# Patient Record
Sex: FEMALE | Race: WHITE | NOT HISPANIC OR LATINO | Employment: OTHER
[De-identification: names, ages, dates, MRNs, and addresses within clinical notes are randomized per-mention and may not be internally consistent; named-entity substitution may affect disease eponyms.]

---

## 2023-02-16 ENCOUNTER — HOSPITAL ENCOUNTER (OUTPATIENT)
Facility: CLINIC | Age: 73
Discharge: HOME | End: 2023-02-16
Admitting: FAMILY MEDICINE
Payer: COMMERCIAL

## 2023-02-16 ENCOUNTER — OFFICE VISIT (OUTPATIENT)
Dept: FAMILY MEDICINE | Facility: CLINIC | Age: 73
End: 2023-02-16
Payer: MEDICARE

## 2023-02-16 VITALS
SYSTOLIC BLOOD PRESSURE: 120 MMHG | HEIGHT: 62 IN | RESPIRATION RATE: 16 BRPM | HEART RATE: 74 BPM | BODY MASS INDEX: 22.84 KG/M2 | DIASTOLIC BLOOD PRESSURE: 75 MMHG | OXYGEN SATURATION: 97 % | TEMPERATURE: 97.8 F | WEIGHT: 124.12 LBS

## 2023-02-16 DIAGNOSIS — M81.0 AGE-RELATED OSTEOPOROSIS WITHOUT CURRENT PATHOLOGICAL FRACTURE: ICD-10-CM

## 2023-02-16 DIAGNOSIS — Z00.00 ENCOUNTER FOR MEDICARE ANNUAL WELLNESS EXAM: Primary | ICD-10-CM

## 2023-02-16 DIAGNOSIS — Z85.820 HISTORY OF MELANOMA: ICD-10-CM

## 2023-02-16 DIAGNOSIS — Z13.220 SCREENING FOR LIPOID DISORDERS: ICD-10-CM

## 2023-02-16 LAB
ANION GAP SERPL CALCULATED.3IONS-SCNC: 14 MMOL/L (ref 7–15)
BUN SERPL-MCNC: 12 MG/DL (ref 8–23)
CALCIUM SERPL-MCNC: 10 MG/DL (ref 8.8–10.2)
CHLORIDE SERPL-SCNC: 100 MMOL/L (ref 98–107)
CHOLEST SERPL-MCNC: 195 MG/DL
CREAT SERPL-MCNC: 0.75 MG/DL (ref 0.51–0.95)
DEPRECATED HCO3 PLAS-SCNC: 26 MMOL/L (ref 22–29)
GFR SERPL CREATININE-BSD FRML MDRD: 83 ML/MIN/1.73M2
GLUCOSE SERPL-MCNC: 88 MG/DL (ref 70–99)
HDLC SERPL-MCNC: 67 MG/DL
LDLC SERPL CALC-MCNC: 111 MG/DL
NONHDLC SERPL-MCNC: 128 MG/DL
POTASSIUM SERPL-SCNC: 4.3 MMOL/L (ref 3.4–5.3)
SODIUM SERPL-SCNC: 140 MMOL/L (ref 136–145)
TRIGL SERPL-MCNC: 87 MG/DL

## 2023-02-16 PROCEDURE — 80061 LIPID PANEL: CPT | Performed by: FAMILY MEDICINE

## 2023-02-16 PROCEDURE — 82306 VITAMIN D 25 HYDROXY: CPT | Performed by: FAMILY MEDICINE

## 2023-02-16 PROCEDURE — 80048 BASIC METABOLIC PNL TOTAL CA: CPT | Performed by: FAMILY MEDICINE

## 2023-02-16 ASSESSMENT — ENCOUNTER SYMPTOMS
CHILLS: 0
HEADACHES: 0
ARTHRALGIAS: 1
BREAST MASS: 0
DYSURIA: 0
SORE THROAT: 0
PALPITATIONS: 0
DIZZINESS: 0
HEMATURIA: 0
JOINT SWELLING: 0
DIARRHEA: 0
ABDOMINAL PAIN: 0
SHORTNESS OF BREATH: 0
MYALGIAS: 1
FREQUENCY: 0
COUGH: 0
HEMATOCHEZIA: 0
HEARTBURN: 0
NAUSEA: 0
NERVOUS/ANXIOUS: 0
PARESTHESIAS: 0
CONSTIPATION: 0
FEVER: 0
EYE PAIN: 0
WEAKNESS: 0

## 2023-02-16 ASSESSMENT — ACTIVITIES OF DAILY LIVING (ADL): CURRENT_FUNCTION: NO ASSISTANCE NEEDED

## 2023-02-16 NOTE — NURSING NOTE
"72 year old  Chief Complaint   Patient presents with     Wellness Visit     Routine medicare.       Blood pressure 133/81, pulse 74, temperature 97.8  F (36.6  C), temperature source Skin, resp. rate 16, height 1.562 m (5' 1.5\"), weight 56.3 kg (124 lb 1.9 oz), SpO2 97 %. Body mass index is 23.07 kg/m .  There is no problem list on file for this patient.      Wt Readings from Last 2 Encounters:   02/16/23 56.3 kg (124 lb 1.9 oz)     BP Readings from Last 3 Encounters:   02/16/23 133/81         No current outpatient medications on file.     No current facility-administered medications for this visit.       Social History     Tobacco Use     Smoking status: Never     Smokeless tobacco: Never   Vaping Use     Vaping Use: Never used   Substance Use Topics     Alcohol use: Not Currently     Drug use: Never       There are no preventive care reminders to display for this patient.    No results found for: PAP      February 16, 2023 2:48 PM  ]  "

## 2023-02-16 NOTE — PROGRESS NOTES
"CC: Wellness Visit (Routine medicare.)      SUBJECTIVE:  Capri is a 72 year old female with osteoporosis here for a physical exam. She recently moved from [facility] to be closer to her grandchildren, ages 2 and 6.     Her current problems were reviewed. The above nursing notes were also reviewed. The past medical history, allergies, family medical history, surgical history and social history were reviewed with her and updated as necessary.        Prevention History:    Activities of Daily Living: no assistance needed  Home safety: lack of grab bars in the bathroom  Hearing Impairment:: no hearing concerns    - Advance directive - she has this at home.  - Fall Risk - none.   - Independent at home - yes.     COGNITIVE SCREEN  1) Repeat 3 items (Banana, Sunrise, Chair)    2) Clock draw: NORMAL  3) 3 item recall: Recalls 2 objects   Results: 3 items recalled: COGNITIVE IMPAIRMENT LESS LIKELY    - Vaccines - did not get pneumonia or shingles vaccines; declines. Up to date on Covid vaccines.   - Cervical cancer screening - No history of abnormal Pap smears.   - Breast Screening - Last mammogram was last year and normal.   - Colorectal cancer screening - Last colon cancer screening was in 2022.  - Osteoporosis screening - 1/2023 - osteoporosis, taking calcium and vitamin D, no prescription medications yet, sister felt very sick on medications.   - Hypertension - Patient is normotensive.  - DM screening - has had normal screening.   - Hyperlipidemia screening - did have elevated cholesterol in the past per her report. Did a CT calcium score and her score was 0.    - Nutrition/Exercise - Body mass index is 23.07 kg/m . Yoga, walking, considering starting Pilates;  Healthy nutrition.  - Alcohol misuse - no concerns.  - Tobacco use - none.        OBJECTIVE:  /75 (BP Location: Right arm, Patient Position: Sitting, Cuff Size: Adult Regular)   Pulse 74   Temp 97.8  F (36.6  C) (Skin)   Resp 16   Ht 1.562 m (5' 1.5\")   Wt " 56.3 kg (124 lb 1.9 oz)   LMP  (LMP Unknown)   SpO2 97%   BMI 23.07 kg/m     GENERAL:  Healthy, alert; in no distress  SKIN: Normal skin color, texture, and turgor  HEENT:  Normocephalic without masses, lesions, tenderness, or abnormalities;  TMs normal, Conjunctiva and sclera clear, PERRL.Oropharyngeal mucosa is moist without lesions or exudates; teeth are in good repair  NECK: Supple and free of adenopathy or masses, no thyroid enlargement or nodules.  CHEST: Clear to auscultation. Normal work of breathing.   CARDIAC: Regular rate and rhythm, normal S1 and S2,  no murmurs, rubs, clicks, or gallops  ABDOMEN: Soft, nontender; no masses or hepatosplenomegaly  EXTREMITIES:  No deformities, edema, skin discoloration, or lesions  NEUROLOGICAL:  CN II - XII intact  PSYCH: Mood and affect appropriate.     ASSESSMENT/PLAN:   Capri was seen today for wellness visit.    Diagnoses and all orders for this visit:    Encounter for Medicare annual wellness exam    Screening for lipoid disorders  -     Basic metabolic panel; Future  -     Lipid Profile; Future  -     Basic metabolic panel  -     Lipid Profile    History of melanoma  -     Adult Dermatology Referral; Future    Age-related osteoporosis without current pathological fracture  -     Vitamin D deficiency screening; Future  -     Vitamin D deficiency screening    Will obtain records from [facility] in [facility]. Discussed osteoporosis dx -- recommend pharmacologic therapy. She would like for me to review the bone density scans and then would consider prescription treatment.   Check labs today.   Referral to dermatology for history of melanoma; requires quarterly skin checks.     Return in about 53 weeks (around 2/22/2024) for Annual Wellness Visit.    Olimpia Lambert MD  Family Medicine

## 2023-02-16 NOTE — PATIENT INSTRUCTIONS
Patient Education   Personalized Prevention Plan  You are due for the preventive services outlined below.  Your care team is available to assist you in scheduling these services.  If you have already completed any of these items, please share that information with your care team to update in your medical record.  Health Maintenance Due   Topic Date Due     Osteoporosis Screening  Never done     Discuss Advance Care Planning  Never done     Mammogram  Never done     COVID-19 Vaccine (1) Never done     Colorectal Cancer Screening  Never done     Hepatitis C Screening  Never done     Diptheria Tetanus Pertussis (DTAP/TDAP/TD) Vaccine (1 - Tdap) Never done     Cholesterol Lab  Never done     Zoster (Shingles) Vaccine (1 of 2) Never done     Annual Wellness Visit  Never done     Pneumococcal Vaccine (1 - PCV) Never done     Flu Vaccine (1) Never done

## 2023-02-17 LAB — DEPRECATED CALCIDIOL+CALCIFEROL SERPL-MC: 48 UG/L (ref 20–75)

## 2023-02-20 ENCOUNTER — TELEPHONE (OUTPATIENT)
Dept: FAMILY MEDICINE | Facility: CLINIC | Age: 73
End: 2023-02-20

## 2023-02-20 NOTE — CONFIDENTIAL NOTE
Called pt to let her know that all of her labs (BMP, lipids, Vit D) came back, they look great and Dr. Lambert has no concerns.  Pt was happy to hear it.    VIRI Awan, RN  02/20/23, 11:46 AM

## 2023-03-15 ENCOUNTER — TELEPHONE (OUTPATIENT)
Dept: DERMATOLOGY | Facility: CLINIC | Age: 73
End: 2023-03-15

## 2023-03-15 ENCOUNTER — OFFICE VISIT (OUTPATIENT)
Dept: DERMATOLOGY | Facility: CLINIC | Age: 73
End: 2023-03-15
Payer: MEDICARE

## 2023-03-15 DIAGNOSIS — Z85.820 HISTORY OF MELANOMA: ICD-10-CM

## 2023-03-15 DIAGNOSIS — L71.9 ROSACEA: Primary | ICD-10-CM

## 2023-03-15 DIAGNOSIS — D23.9 BLUE NEVUS: ICD-10-CM

## 2023-03-15 PROCEDURE — 99204 OFFICE O/P NEW MOD 45 MIN: CPT | Performed by: DERMATOLOGY

## 2023-03-15 ASSESSMENT — PAIN SCALES - GENERAL: PAINLEVEL: NO PAIN (0)

## 2023-03-15 NOTE — PROGRESS NOTES
[facility] Dermatology Note  Encounter Date: Mar 15, 2023  Office Visit     Dermatology Problem List:  1. Hx melanoma, nasal bridge, [facility]  - MILKA signed  2. Rosacea  ____________________________________________    Assessment & Plan:    # Hx melanoma (unknown path), nasal bridge, s/p MMS 2014. No evidence of clinical recurrence on exam today.  - Records [place]irstin Escobar ([place])  - Follow-up for skin exam in September  - ABCDs of melanoma were discussed and self skin checks were advised.  - Sun precaution was advised including the use of sun screens of SPF 30 or higher, sun protective clothing, and avoidance of tanning beds.    # ET type rosacea, nose. Chronic, flare.   Discussed option for treatment including prescription topical therapies and PDL. Discussed that topical therapies may have more limited benefit for ET-type rosacea. Patient agreed to consult with Dr. Gomes to discuss PDL.   - Referral to cosmetic dermatology for PDL    # Blue nevus, left eyebrow  - Reassured of benign nature, no treatment necessary.   - ABCDs of melanoma were discussed and self skin checks were advised.  - Sun precaution was advised including the use of sun screens of SPF 30 or higher, sun protective clothing, and avoidance of tanning beds.      Procedures Performed:   None    Follow-up: 6 month(s) in-person, or earlier for new or changing lesions    Staff and Scribe:     Scribe Disclosure:  I, Elan Sunshine, am serving as a scribe to document services personally performed by Chase Sherman MD based on data collection and the provider's statements to me.     Provider Disclosure:   The documentation recorded by the scribe accurately reflects the services I personally performed and the decisions made by me.    Chase Sherman MD    Department of Dermatology  [facility]: Phone: [phone],  Fax:[phone]  [facility]: Phone: [phone] Fax: [phone]  ____________________________________________    CC: Derm Problem (Pt would like to establish care with a new dermatologist.)    HPI:  Ms. Capri Simpson is a(n) 73 year old female who presents today as a new patient to establish care. Patient was referred to dermatology for history of melanoma by PCP.    Today, patient reports that she has a history of melanoma on the nose in approximately 2014 by Dr. Claudia Escobar. She just moved from [place] where she recently had a FBSE. Denies family history of melanoma. She reports a history of many blistering sunburns. No history of indoor tanning or outdoor work. She declines FBSE today. She also reports that she gets rosacea on the nose.    Patient is otherwise feeling well, without additional skin concerns.    Labs Reviewed:  N/A    Physical Exam:  Vitals: LMP  (LMP Unknown)   SKIN: Focused examination of face was performed.  - Well healed scar on the nose.  - Telangiectasias on bilateral nose.  - Blue papule on the left eyebrow.  - No other lesions of concern on areas examined.     Medications:  Current Outpatient Medications   Medication     Calcium-Vitamin D 500-3.125 MG-MCG TABS     No current facility-administered medications for this visit.      Past Medical History:   Patient Active Problem List   Diagnosis     History of melanoma     Age-related osteoporosis without current pathological fracture     Past Medical History:   Diagnosis Date     Melanoma of skin (H)     On tip of nose, removed by dermatologist. Dr Escobar     Osteoporosis     2019 and 2023        CC Olimpia Lambert MD  [address] [place] [address] on close of this encounter.

## 2023-03-15 NOTE — LETTER
3/15/2023       RE: Capri Simpson  [address]  [facility] [address]     Dear Colleague,    Thank you for referring your patient, Capri Simpson, to the [facility] [place] at [facility]. Please see a copy of my visit note below.    [facility] Dermatology Note  Encounter Date: Mar 15, 2023  Office Visit     Dermatology Problem List:  1. Hx melanoma, nasal bridge, s/p MMS (?2014) in [facility]  - MILKA signed  2. Rosacea  ____________________________________________    Assessment & Plan:    # Hx melanoma (unknown path), nasal bridge, s/p MMS 2014. No evidence of clinical recurrence on exam today.  - Records [place] Kirstin [place]obar ([place]  - Follow-up for skin exam in September  - ABCDs of melanoma were discussed and self skin checks were advised.  - Sun precaution was advised including the use of sun screens of SPF 30 or higher, sun protective clothing, and avoidance of tanning beds.    # ET type rosacea, nose. Chronic, flare.   Discussed option for treatment including prescription topical therapies and PDL. Discussed that topical therapies may have more limited benefit for ET-type rosacea. Patient agreed to consult with Dr. Gomes to discuss PDL.   - Referral to cosmetic dermatology for PDL    # Blue nevus, left eyebrow  - Reassured of benign nature, no treatment necessary.   - ABCDs of melanoma were discussed and self skin checks were advised.  - Sun precaution was advised including the use of sun screens of SPF 30 or higher, sun protective clothing, and avoidance of tanning beds.      Procedures Performed:   None    Follow-up: 6 month(s) in-person, or earlier for new or changing lesions    Staff and Scribe:     Scribe Disclosure:  Elan PIPER, am serving as a scribe to document services personally performed by Chase Sherman MD based on data collection and the provider's  statements to me.     Provider Disclosure:   The documentation recorded by the scribe accurately reflects the services I personally performed and the decisions made by me.    Chase Sherman MD    Department of Dermatology  [facility]: Phone: [phone], Fax:[phone]  [facility]: Phone: [phone] Fax: [phone]  ____________________________________________    CC: Derm Problem (Pt would like to establish care with a new dermatologist.)    HPI:  Ms. Capri Simpson is a(n) 73 year old female who presents today as a new patient to establish care. Patient was referred to dermatology for history of melanoma by PCP.    Today, patient reports that she has a history of melanoma on the nose in approximately 2014 by Dr. Claudia Escobar. She just moved from [place] where she recently had a FBSE. Denies family history of melanoma. She reports a history of many blistering sunburns. No history of indoor tanning or outdoor work. She declines FBSE today. She also reports that she gets rosacea on the nose.    Patient is otherwise feeling well, without additional skin concerns.    Labs Reviewed:  N/A    Physical Exam:  Vitals: LMP  (LMP Unknown)   SKIN: Focused examination of face was performed.  - Well healed scar on the nose.  - Telangiectasias on bilateral nose.  - Blue papule on the left eyebrow.  - No other lesions of concern on areas examined.     Medications:  Current Outpatient Medications   Medication     Calcium-Vitamin D 500-3.125 MG-MCG TABS     No current facility-administered medications for this visit.      Past Medical History:   Patient Active Problem List   Diagnosis     History of melanoma     Age-related osteoporosis without current pathological fracture     Past Medical History:   Diagnosis Date     Melanoma of skin (H)     On tip of nose, removed by dermatologist.   Shawn     Osteoporosis     2019 and 2023        CC Olimpia Lambert MD  [address] [place] [address] on close of this encounter.

## 2023-03-15 NOTE — TELEPHONE ENCOUNTER
Writer faxed release of information to dermatologist in [place] - Kirstin Escobar MD.    [address] [place] [address] [address]  [place] [address]  Hours: Monday - Friday: 7:30AM - 8PM (ET)   T. [phone]  F. [phone]      Nasir Gonsales, EMT

## 2023-03-15 NOTE — NURSING NOTE
Chief Complaint   Patient presents with     Derm Problem     Pt would like to establish care with a new dermatologist.     Nasir Gonsales, EMT

## 2023-05-21 ENCOUNTER — HEALTH MAINTENANCE LETTER (OUTPATIENT)
Age: 73
End: 2023-05-21

## 2023-05-23 ENCOUNTER — TELEPHONE (OUTPATIENT)
Dept: DERMATOLOGY | Facility: CLINIC | Age: 73
End: 2023-05-23
Payer: MEDICARE

## 2023-05-23 NOTE — TELEPHONE ENCOUNTER
Lvm, 2nd attempt informing patient her appt. With Dr. Sherman 9/20 has been cancelled. Dr. Sherman will not be in clinic. Please call [phone] to r/s.

## 2023-06-08 ENCOUNTER — MYC MEDICAL ADVICE (OUTPATIENT)
Dept: FAMILY MEDICINE | Facility: CLINIC | Age: 73
End: 2023-06-08

## 2023-10-10 RX ORDER — AZELAIC ACID 0.15 G/G
GEL TOPICAL
COMMUNITY
Start: 2023-01-04 | End: 2024-04-10

## 2023-10-10 RX ORDER — PIMECROLIMUS 10 MG/G
CREAM TOPICAL
COMMUNITY
Start: 2023-01-04

## 2023-10-11 ENCOUNTER — OFFICE VISIT (OUTPATIENT)
Dept: FAMILY MEDICINE | Facility: CLINIC | Age: 73
End: 2023-10-11
Payer: MEDICARE

## 2023-10-11 VITALS
TEMPERATURE: 97.3 F | HEIGHT: 61 IN | BODY MASS INDEX: 23.8 KG/M2 | SYSTOLIC BLOOD PRESSURE: 115 MMHG | HEART RATE: 69 BPM | OXYGEN SATURATION: 98 % | DIASTOLIC BLOOD PRESSURE: 77 MMHG | WEIGHT: 126.04 LBS

## 2023-10-11 DIAGNOSIS — M81.0 AGE-RELATED OSTEOPOROSIS WITHOUT CURRENT PATHOLOGICAL FRACTURE: Primary | ICD-10-CM

## 2023-10-11 DIAGNOSIS — M54.2 NECK PAIN: ICD-10-CM

## 2023-10-11 LAB
ALBUMIN SERPL BCG-MCNC: 4.4 G/DL (ref 3.5–5.2)
ALP SERPL-CCNC: 77 U/L (ref 35–104)
ALT SERPL W P-5'-P-CCNC: 8 U/L (ref 0–50)
ANION GAP SERPL CALCULATED.3IONS-SCNC: 9 MMOL/L (ref 7–15)
AST SERPL W P-5'-P-CCNC: 20 U/L (ref 0–45)
BILIRUB SERPL-MCNC: 0.5 MG/DL
BUN SERPL-MCNC: 12.6 MG/DL (ref 8–23)
CALCIUM SERPL-MCNC: 9.7 MG/DL (ref 8.8–10.2)
CHLORIDE SERPL-SCNC: 102 MMOL/L (ref 98–107)
CREAT SERPL-MCNC: 0.77 MG/DL (ref 0.51–0.95)
DEPRECATED HCO3 PLAS-SCNC: 29 MMOL/L (ref 22–29)
EGFRCR SERPLBLD CKD-EPI 2021: 80 ML/MIN/1.73M2
ERYTHROCYTE [DISTWIDTH] IN BLOOD BY AUTOMATED COUNT: 13.5 % (ref 10–15)
GLUCOSE SERPL-MCNC: 82 MG/DL (ref 70–99)
HCT VFR BLD AUTO: 41.5 % (ref 35–47)
HGB BLD-MCNC: 13.2 G/DL (ref 11.7–15.7)
MCH RBC QN AUTO: 27.3 PG (ref 26.5–33)
MCHC RBC AUTO-ENTMCNC: 31.8 G/DL (ref 31.5–36.5)
MCV RBC AUTO: 86 FL (ref 78–100)
PHOSPHATE SERPL-MCNC: 3.6 MG/DL (ref 2.5–4.5)
PLATELET # BLD AUTO: 258 10E3/UL (ref 150–450)
POTASSIUM SERPL-SCNC: 4.7 MMOL/L (ref 3.4–5.3)
PROT SERPL-MCNC: 7 G/DL (ref 6.4–8.3)
RBC # BLD AUTO: 4.84 10E6/UL (ref 3.8–5.2)
SODIUM SERPL-SCNC: 140 MMOL/L (ref 135–145)
TSH SERPL DL<=0.005 MIU/L-ACNC: 1.53 UIU/ML (ref 0.3–4.2)
WBC # BLD AUTO: 6.8 10E3/UL (ref 4–11)

## 2023-10-11 PROCEDURE — 85027 COMPLETE CBC AUTOMATED: CPT | Mod: ORL | Performed by: FAMILY MEDICINE

## 2023-10-11 PROCEDURE — 80053 COMPREHEN METABOLIC PANEL: CPT | Mod: ORL | Performed by: FAMILY MEDICINE

## 2023-10-11 PROCEDURE — 84443 ASSAY THYROID STIM HORMONE: CPT | Mod: ORL | Performed by: FAMILY MEDICINE

## 2023-10-11 PROCEDURE — 83970 ASSAY OF PARATHORMONE: CPT | Mod: ORL | Performed by: FAMILY MEDICINE

## 2023-10-11 PROCEDURE — 84100 ASSAY OF PHOSPHORUS: CPT | Mod: ORL | Performed by: FAMILY MEDICINE

## 2023-10-11 NOTE — PROGRESS NOTES
"CC: Osteoporosis (Discuss options for treatment )      Subjective:   Capri Simpson is a 73 year old who presents for follow-up of DEXA scan from [facility].         Osteoporosis. Diagnosed by DEXA in 2019.  - Most recent DEXA in 1/5/2023 showed severe osteoporosis. Plan repeat DEXA in 2 years.   - She has not had a fracture.   - Medications: Currently taking 500 mg of calcium daily and 2000 international units of vitamin D daily.   - Rx treatment started? No. She had initially declined medication, but is now interested in starting.   - She has never smoked. No alcohol use.   - Mom and sister also with osteoporosis.   - Does yoga regularly and walks regularly.   - Most recent surveillance labs reviewed today and are significant for normal vitamin D level and normal BMP.    Fracture hx: none as an adult   Chronic glucocorticoid use: none   Previous osteoporosis treatment(s): none   Hx of hypercalcemia: none   Hx of nephrolithiasis: none      Contraindications to osteoporosis treatment options:  Bisphosphonates:              -No history of GERD               -No history of kidney disease  PTH Analogs:              -Denies known history of hypercalcemia              -Denies known history of nephrolithiasis              -Denies known family history of osteosarcoma              -Denies history of external beam radiation therapy  Evenity:              -Denies MI or CVA within the last 12 months     Upcoming invasive dental work: Going to have an implant. Has an appt for the dentist later today.               Also at the end of the visit (after labs drawn), she asked to speak to me again to place a referral to PT for neck pain. She thinks related to how she is sleeping.           Objective:   /77   Pulse 69   Temp 97.3  F (36.3  C)   Ht 1.562 m (5' 1.5\")   Wt 57.2 kg (126 lb 0.6 oz)   LMP  (LMP Unknown)   SpO2 98%   BMI 23.43 kg/m     General: Alert and oriented in no acute distress.  Psych: Mood and " affect appropriate.      Component      Latest Ref Rng 2/16/2023  3:28 PM   Sodium      136 - 145 mmol/L 140    Potassium      3.4 - 5.3 mmol/L 4.3    Chloride      98 - 107 mmol/L 100    Carbon Dioxide (CO2)      22 - 29 mmol/L 26    Anion Gap      7 - 15 mmol/L 14    Urea Nitrogen      8.0 - 23.0 mg/dL 12.0    Creatinine      0.51 - 0.95 mg/dL 0.75    Calcium      8.8 - 10.2 mg/dL 10.0    Glucose      70 - 99 mg/dL 88    GFR Estimate      >60 mL/min/1.73m2 83    Vitamin D Deficiency screening      20 - 75 ug/L 48          Plan:    Severe osteoporosis    Discussed treatment options  R/o secondary causes with labs today     Osteoporosis medications called bisphosphonates (Fosamax, Actonel, Boniva) should be taken on an empty stomach and you should sit up for 30 minutes after taking the medication. Oral bisphosphonates should not be used as initial therapy in patients with esophageal disorders, an inability to follow the dosing requirements (eg, stay upright for at least 30 to 60 minutes), or chronic kidney disease (CKD; estimated glomerular filtration [eGFR] rate <30 mL/min). She is a bit wary about bisphosphonates, peg with her upcoming dental work.     Also discussed Forteo, Prolia, or Evenity. She will schedule a visit with Dr. Oropeza to discuss options in detail and consider insurance issues.     Repeat DEXA in 2 years.     Neck pain - At the end of the visit (after labs drawn), she asked to speak to me again to place a referral to PT for neck pain. She thinks related to how she is sleeping. PT referral placed.       Olimpia Lambert MD

## 2023-10-11 NOTE — NURSING NOTE
"73 year old  Chief Complaint   Patient presents with    Osteoporosis     Discuss options for treatment        Blood pressure 115/77, pulse 69, temperature 97.3  F (36.3  C), height 1.562 m (5' 1.5\"), weight 57.2 kg (126 lb 0.6 oz), SpO2 98%. Body mass index is 23.43 kg/m .  Patient Active Problem List   Diagnosis    History of melanoma    Age-related osteoporosis without current pathological fracture       Wt Readings from Last 2 Encounters:   10/11/23 57.2 kg (126 lb 0.6 oz)   02/16/23 56.3 kg (124 lb 1.9 oz)     BP Readings from Last 3 Encounters:   10/11/23 115/77   02/16/23 120/75         Current Outpatient Medications   Medication    azelaic acid (FINACIA) 15 % external gel    Calcium-Vitamin D 500-3.125 MG-MCG TABS    pimecrolimus (ELIDEL) 1 % external cream     No current facility-administered medications for this visit.       Social History     Tobacco Use    Smoking status: Never    Smokeless tobacco: Never   Vaping Use    Vaping Use: Never used   Substance Use Topics    Alcohol use: Not Currently    Drug use: Never       Health Maintenance Due   Topic Date Due    DEXA  Never done    MAMMO SCREENING  Never done    COVID-19 Vaccine (1) Never done    COLORECTAL CANCER SCREENING  Never done    HEPATITIS C SCREENING  Never done    DTAP/TDAP/TD IMMUNIZATION (1 - Tdap) Never done    ZOSTER IMMUNIZATION (1 of 2) Never done    RSV VACCINE 60+ (1 - 1-dose 60+ series) Never done    Pneumococcal Vaccine: 65+ Years (1 - PCV) Never done    INFLUENZA VACCINE (1) Never done       No results found for: \"PAP\"      October 11, 2023 11:37 AM    "

## 2023-10-11 NOTE — Clinical Note
Would you be able to follow-up with Capri regarding osteoporosis meds? She has severe osteoporosis of the lumbar spine, has never been treated before, and is interested in non-bisphosphonate option. We discussed maybe Prolia

## 2023-10-12 LAB — PTH-INTACT SERPL-MCNC: 43 PG/ML (ref 15–65)

## 2023-10-18 ENCOUNTER — THERAPY VISIT (OUTPATIENT)
Dept: PHYSICAL THERAPY | Facility: CLINIC | Age: 73
End: 2023-10-18
Attending: FAMILY MEDICINE
Payer: MEDICARE

## 2023-10-18 DIAGNOSIS — M54.2 NECK PAIN: ICD-10-CM

## 2023-10-18 PROCEDURE — 97161 PT EVAL LOW COMPLEX 20 MIN: CPT | Mod: GP | Performed by: PHYSICAL THERAPIST

## 2023-10-18 PROCEDURE — 97110 THERAPEUTIC EXERCISES: CPT | Mod: GP | Performed by: PHYSICAL THERAPIST

## 2023-10-18 NOTE — PROGRESS NOTES
PHYSICAL THERAPY EVALUATION  Type of Visit: Evaluation    See electronic medical record for Abuse and Falls Screening details.    Subjective       Presenting condition or subjective complaint: neck pain  MD order 10/11/23  Pt reports neck pain.  Did have pain in the past which she did PT and acupuncture for which really helped.  Then pain came back in July 2023. Neck pain and into left shoulder. Tried the chiropractor with a pulsing treatment which helped.  Does get a buzzing sound in the ear which has caused her to try a new pillow lately.   Can get pain when she wakes up in the AM.   Is looking for a good set of exercises to do in the AM or during the day to help with pain.    Date of onset: 10/11/23 (MD order. Onset July 2023)    Relevant medical history: Osteoporosis   Dates & types of surgery: none    Prior diagnostic imaging/testing results:       Prior therapy history for the same diagnosis, illness or injury: Yes PT and acupuncture      Living Environment  Social support: With a significant other or spouse   Type of home: Apartment/condo   Stairs to enter the home: Yes 20 Is there a railing: Yes   Ramp: No   Stairs inside the home: No       Help at home: None  Equipment owned:       Employment: Yes psychotherapist  Hobbies/Interests: knitting, walking    Patient goals for therapy: reduce discomfort       Objective   CERVICAL SPINE EVALUATION  PAIN: Pain Level at Rest: 0/10  Pain Level at worst: 3/10  Pain Location/present symptoms: left neck and into left shoulder  Paresthesia (yes/no):  none  Pain Quality: tight, discomfort  Pain Frequency: intermittent  Pain is Worst: in the morning   Pain is Exacerbated By: walking up in the morning, sleeping on left side  Pain is Relieved By: cream (voltaran, biofreeze)  Pain Progression: Unchanged    Movement Loss:   Ned Mod Min Nil Symptoms   Flexion    x NE   Retraction  x   L neck tightness   Extension  x   NE   Lateral flexion R x    Stiffness, mild pain   Lateral  flexion L  x   Stiffness, mild pain   Rotation R  x   Tightness on R   Rotation L  x   Incr pain     Thoracic ROM:    Left AROM Right AROM    Thoracic Flexion     Thoracic Extension Mod limit - stiffness    Thoracic Rotation Mod limit Min limit        SHOULDER STRENGTH:   Pain: - none + mild ++ moderate +++ severe  Strength Scale: 0-5/5 Left Right   Shoulder Flexion 5- 5-   Shoulder Abduction 5- 5-   Shoulder External Rotation 4+ 5-   Shoulder Horizontal Abduction 4 4     Posture: mild forward head posture  PALPATION:  TTP and incr tone of UT and levator      Assessment & Plan   CLINICAL IMPRESSIONS  Medical Diagnosis: Neck pain    Treatment Diagnosis: neck pain   Impression/Assessment: Patient is a 73 year old female with left sided neck complaints.  The following significant findings have been identified: Pain, Decreased ROM/flexibility, Decreased strength, Decreased activity tolerance, and Impaired posture. These impairments interfere with their ability to perform  sleeping  as compared to previous level of function.     Clinical Decision Making (Complexity):  Clinical Presentation: Stable/Uncomplicated  Clinical Presentation Rationale: based on medical and personal factors listed in PT evaluation  Clinical Decision Making (Complexity): Low complexity    PLAN OF CARE  Treatment Interventions:  Interventions: Manual Therapy, Neuromuscular Re-education, Therapeutic Activity, Therapeutic Exercise    Long Term Goals     PT Goal 1  Goal Identifier: neck  Goal Description: pt will be able to wake up in the AM painfree in the neck  Rationale:  (to establish restorative sleep pattern)  Goal Progress: pain 3/10  Target Date: 12/17/23      Frequency of Treatment: 2x/month  Duration of Treatment: 2 months      Education Assessment:        Risks and benefits of evaluation/treatment have been explained.   Patient/Family/caregiver agrees with Plan of Care.     Evaluation Time:     PT Eval, Low Complexity Minutes (15841):  20       Signing Clinician: Chelly Field PT      [facility]                                                                                   OUTPATIENT PHYSICAL THERAPY      PLAN OF TREATMENT FOR OUTPATIENT REHABILITATION   Patient's Last Name, First Name, ALIZAMyeshaCapri Griffin YOB: 1950   Provider's Name   [facility]   Medical Record No.  [id number]     Onset Date: 10/11/23 (MD order. Onset July 2023)  Start of Care Date: 10/18/23     Medical Diagnosis:  Neck pain      PT Treatment Diagnosis:  neck pain Plan of Treatment  Frequency/Duration: 2x/month/ 2 months    Certification date from 10/18/23 to 12/17/23         See note for plan of treatment details and functional goals     Chelly Field, PT                         I CERTIFY THE NEED FOR THESE SERVICES FURNISHED UNDER        THIS PLAN OF TREATMENT AND WHILE UNDER MY CARE     (Physician attestation of this document indicates review and certification of the therapy plan).                Referring Provider:  Olimpia Lambert      Initial Assessment  See Epic Evaluation- Start of Care Date: 10/18/23

## 2023-10-19 ENCOUNTER — OFFICE VISIT (OUTPATIENT)
Dept: DERMATOLOGY | Facility: CLINIC | Age: 73
End: 2023-10-19
Payer: MEDICARE

## 2023-10-19 DIAGNOSIS — L81.4 SOLAR LENTIGO: ICD-10-CM

## 2023-10-19 DIAGNOSIS — D23.9 BLUE NEVUS: ICD-10-CM

## 2023-10-19 DIAGNOSIS — L71.9 ROSACEA: Primary | ICD-10-CM

## 2023-10-19 DIAGNOSIS — L82.1 SEBORRHEIC KERATOSES: ICD-10-CM

## 2023-10-19 DIAGNOSIS — D22.9 MULTIPLE BENIGN NEVI: ICD-10-CM

## 2023-10-19 DIAGNOSIS — L57.8 PHOTOAGING OF SKIN: ICD-10-CM

## 2023-10-19 DIAGNOSIS — Z85.820 HISTORY OF MELANOMA: ICD-10-CM

## 2023-10-19 PROCEDURE — 99213 OFFICE O/P EST LOW 20 MIN: CPT | Mod: GC | Performed by: STUDENT IN AN ORGANIZED HEALTH CARE EDUCATION/TRAINING PROGRAM

## 2023-10-19 ASSESSMENT — PAIN SCALES - GENERAL: PAINLEVEL: NO PAIN (0)

## 2023-10-19 NOTE — PATIENT INSTRUCTIONS

## 2023-10-19 NOTE — PROGRESS NOTES
[facility] Dermatology Note  Encounter Date: Oct 19, 2023  Office Visit     Dermatology Problem List:  1. Hx melanoma, nasal bridge, s/p MMS (?2014) in [facility]  - MILKA signed, advised patient to contact clinic and to bring records  2. Rosacea  - Planning for PDL  ____________________________________________    Assessment & Plan:  # Hx melanoma (unknown path), nasal bridge, s/p MMS 2014. No evidence of clinical recurrence on exam today.  - Records requested [place] Kirstin Escobar ([place] previously, advised patient to contact clinic herself and to bring records  - Follow-up for skin exam yearly  - ABCDs of melanoma were discussed and self skin checks were advised.  - Sun precaution was advised including the use of sun screens of SPF 30 or higher, sun protective clothing, and avoidance of tanning beds.    # ET type rosacea, nose.   - Has PDL scheduled with Dr. Gomes    # Blue nevus, left eyebrow  - Reassured of benign nature, no treatment necessary.   - ABCDs of melanoma were discussed and self skin checks were advised.  - Sun precaution was advised including the use of sun screens of SPF 30 or higher, sun protective clothing, and avoidance of tanning beds.    # Benign skin findings  - Physical exam demonstrating benign skin findings as below.  - Seborrheic keratoses  - Solar lentingos   - Benign nevi  - Reassurance provided.  - ABCDEs of melanoma handout provided.  - Advised patient to return to clinic for any new or concerning lesions.      Procedures Performed:   None    Follow-up: 12 month(s) in-person, or earlier for new or changing lesions    Staff and Resident: MARSHALL Guerrero MD  PGY-4 Dermatology  Pager: [phone]     ____________________________________________    CC: Derm Problem (FBSE- no spots of concern. She reports having rosacea. )    HPI:  Ms. Capri Simpson is a(n) 73 year old female who presents today as a return patient to follow up skin check    - no lesions of concern  -  left eyebrow nevus stable  - had to cancel PDL, rescheduled for January  - no f/c/wl/ns    Prior hx:  Today, patient reports that she has a history of melanoma on the nose in approximately 2014 by Dr. Claudia Escobar. She just moved from [place] where she recently had a FBSE. Denies family history of melanoma. She reports a history of many blistering sunburns. No history of indoor tanning or outdoor work. She declines FBSE today. She also reports that she gets rosacea on the nose.    Patient is otherwise feeling well, without additional skin concerns.    Labs Reviewed:  N/A    Physical Exam:  Vitals: LMP  (LMP Unknown)   SKIN: TBSE performed excluding breast, buttock and groin (patient deferred).   - Well healed scar on the nose.  - Telangiectasias on bilateral nose.  - Blue papule on the left eyebrow.  - waxy stuck on papules on trunk and extremities  - banal appearing brown papules with reticular networks on dermoscopy on trunk and extremities  - no cervical, axillary, supraclavicular LAD   - light brown macules on sun exposed sites  - No other lesions of concern on areas examined.     Medications:  Current Outpatient Medications   Medication    azelaic acid (FINACIA) 15 % external gel    Calcium-Vitamin D 500-3.125 MG-MCG TABS    pimecrolimus (ELIDEL) 1 % external cream     No current facility-administered medications for this visit.      Past Medical History:   Patient Active Problem List   Diagnosis    History of melanoma    Age-related osteoporosis without current pathological fracture    Neck pain     Past Medical History:   Diagnosis Date    Melanoma of skin (H)     On tip of nose, removed by dermatologist. Dr Escobar    Osteoporosis     2019 and 2023        CC Olimpia Lambert MD  [address] [place] [address] on close of this encounter.

## 2023-10-19 NOTE — NURSING NOTE
Dermatology Rooming Note    Capri Simpson's goals for this visit include:   Chief Complaint   Patient presents with    Derm Problem     FBSE- no spots of concern. She reports having rosacea.      Charito Chapman, EMT

## 2023-10-19 NOTE — LETTER
10/19/2023       RE: Capri Simpson  [address]  [facility] [address]     Dear Colleague,    Thank you for referring your patient, Capri Simpson, to the [facility] [place] at [facility]. Please see a copy of my visit note below.    [facility] Dermatology Note  Encounter Date: Oct 19, 2023  Office Visit     Dermatology Problem List:  1. Hx melanoma, nasal bridge, s/p MMS (?2014) in [facility]  - MILKA signed, advised patient to contact clinic and to bring records  2. Rosacea  - Planning for PDL  ____________________________________________    Assessment & Plan:  # Hx melanoma (unknown path), nasal bridge, s/p MMS 2014. No evidence of clinical recurrence on exam today.  - Records requested from Dr[place] Kirstin Escobar ([place] previously, advised patient to contact clinic herself and to bring records  - Follow-up for skin exam yearly  - ABCDs of melanoma were discussed and self skin checks were advised.  - Sun precaution was advised including the use of sun screens of SPF 30 or higher, sun protective clothing, and avoidance of tanning beds.    # ET type rosacea, nose.   - Has PDL scheduled with Dr. Gomes    # Blue nevus, left eyebrow  - Reassured of benign nature, no treatment necessary.   - ABCDs of melanoma were discussed and self skin checks were advised.  - Sun precaution was advised including the use of sun screens of SPF 30 or higher, sun protective clothing, and avoidance of tanning beds.    # Benign skin findings  - Physical exam demonstrating benign skin findings as below.  - Seborrheic keratoses  - Solar lentingos   - Benign nevi  - Reassurance provided.  - ABCDEs of melanoma handout provided.  - Advised patient to return to clinic for any new or concerning lesions.      Procedures Performed:   None    Follow-up: 12 month(s) in-person, or earlier for new or changing lesions    Staff and Resident: MARSHALL  Jasper Guerrero MD  PGY-4 Dermatology  Pager: [phone]     ____________________________________________    CC: Derm Problem (FBSE- no spots of concern. She reports having rosacea. )    HPI:  Ms. Capri Simpson is a(n) 73 year old female who presents today as a return patient to follow up skin check    - no lesions of concern  - left eyebrow nevus stable  - had to cancel PDL, rescheduled for January  - no f/c/wl/ns    Prior hx:  Today, patient reports that she has a history of melanoma on the nose in approximately 2014 by Dr. Claudia Escobar. She just moved from [place] where she recently had a FBSE. Denies family history of melanoma. She reports a history of many blistering sunburns. No history of indoor tanning or outdoor work. She declines FBSE today. She also reports that she gets rosacea on the nose.    Patient is otherwise feeling well, without additional skin concerns.    Labs Reviewed:  N/A    Physical Exam:  Vitals: LMP  (LMP Unknown)   SKIN: TBSE performed excluding breast, buttock and groin (patient deferred).   - Well healed scar on the nose.  - Telangiectasias on bilateral nose.  - Blue papule on the left eyebrow.  - waxy stuck on papules on trunk and extremities  - banal appearing brown papules with reticular networks on dermoscopy on trunk and extremities  - no cervical, axillary, supraclavicular LAD   - light brown macules on sun exposed sites  - No other lesions of concern on areas examined.     Medications:  Current Outpatient Medications   Medication    azelaic acid (FINACIA) 15 % external gel    Calcium-Vitamin D 500-3.125 MG-MCG TABS    pimecrolimus (ELIDEL) 1 % external cream     No current facility-administered medications for this visit.      Past Medical History:   Patient Active Problem List   Diagnosis    History of melanoma    Age-related osteoporosis without current pathological fracture    Neck pain     Past Medical History:   Diagnosis Date    Melanoma of skin (H)     On tip  of nose, removed by dermatologist. Dr Escobar    Osteoporosis     2019 and 2023        CC Olimpia Lambert MD  [address] [place] [address] on close of this encounter.    Attestation signed by Matteo Hutchinson MD at 10/19/2023  2:48 PM:  I have personally examined this patient and agree with the resident doctor's documentation and plan of care. I have reviewed and amended the resident's note. The documentation accurately reflects my clinical observations, diagnoses, treatment and follow-up plans.     Matteo Hutchinson MD  Dermatology Staff      Again, thank you for allowing me to participate in the care of your patient.      Sincerely,    Dallas Guerrero MD

## 2023-10-24 NOTE — PATIENT INSTRUCTIONS
Pulsed Dye Laser (PDL)    I will experience redness, swelling, pain, and heat sensation. I may experience bruising, itching, or acne. Risks are blistering, oozing, permanent scarring, hair loss, temporary or permanent skin lightening or darkening, infection, and eye injury. I understand my outcome could be no improvement, slight improvement. Multiple treatments may be required.    After treatment, Do Not:  Rub, scratch, or put weight on the site for 2 weeks  Wear tight fitting clothing or jewelry over the site  [place]. Keep the site out of sunlight. Use sunscreen of 30 SPF or greater when in the sun. Use sunscreen 30 minutes before going out and reapply if sweating. Tanning decreases the success of the treatment    How do I care for the treated site?  Use ice packs for 10-20 minutes after the procedure for swelling   If the site is on your face, use ice again 1 hour after treatment for ten minutes and repeat again before bed. Do not burn the skin with the ice.   If a scab or crust forms, gently cleanse the site with water. Then put on Vaseline  ointment 3 times a day and contact the clinic   If a blister forms, contact the clinic by phone  If you have concerns about swelling, call the clinic  Avoid sun exposure and do not get tan as this can darken the treated area, use sunscreen  Do not use makeup on any open wound  Do not come to your next treatment with a tan    What should I expect?  Mild swelling  Blue-purple color that may take 2 to 3 weeks to go away  Redness may also last a week or longer  Results may take up to 3 or 4 months after treatment  More procedures may be needed    Who should I call with questions?  [facility]: [phone]  [facility]: [phone]  For urgent needs outside of business hours call the [facility] at [phone] and ask for the dermatology resident on call  [employer] messaging response may be delayed, please call for  urgent issues Cryotherapy    What is it?  Use of a very cold liquid, such as liquid nitrogen, to freeze and destroy abnormal skin cells that need to be removed    What should I expect?  Tenderness and redness  A small blister that might grow and fill with dark purple blood. There may be crusting.  More than one treatment may be needed if the lesions do not go away.    How do I care for the treated area?  Gently wash the area with your hands when bathing.  Use a thin layer of Vaseline to help with healing. You may use a Band-Aid.   The area should heal within 7-10 days and may leave behind a pink or lighter color.   Do not use an antibiotic or Neosporin ointment.   You may take acetaminophen (Tylenol) for pain.     Call your doctor if you have:  Severe pain  Signs of infection (warmth, redness, cloudy yellow drainage, and or a bad smell)  Questions or concerns    Who should I call with questions?      [facility]: [phone]      [facility]: [phone]      For urgent needs outside of business hours call the [facility] at [phone] and ask for the dermatology resident on call         Your skin regimen will be as follows:  Use your Finacea in the morning, followed by a moisturizer and sunscreen. In the evening, use your skin discoloration defense serum and follow with a moisturizer.

## 2023-10-24 NOTE — PROGRESS NOTES
[facility] Dermatology Note  Encounter Date: Oct 25, 2023  Office Visit     Dermatology Problem List:  1. Hx melanoma, nasal bridge, s/p MMS (?2014) in [facility]  - MILKA signed  2. Rosacea, ET Type, nose, cheeks, nasal ala.  - Current Tx: PDL 10/25/23, Finacea in AM, [employer] Skin Discoloration Defense in PM  3. Scar, nose, S/P MMS after melanoma dx  - Current Tx: PDL 10/25/23    ____________________________________________    Assessment & Plan:    # ET type rosacea, nose. Chronic, flare. Cosmetic referral from Dr. Chase Sherman. Discussed principal of pulse dye laser therapy for rosacea and expectation of 20% improvement per treatment.  Discussed to expect between 3-6 treatments spaced 4 weeks apart and then approximately every year maintenance therapy.  Discussed that this considered a cosmetic procedure and is generally an out of pocket expense.  Discussed risk of bruising (common) and blistering/scarring (rare).  Discussed the importance of daily sun protection. Also discussed risks and benefits of alternative treatments, such as glow facial, chemical peels, microblading, and changing skin care regimen. Patient is agreeable to PDL today, glow facial, and updating her skin care regimen.  - PDL performed today. See procedure note.  - Start Finacea in AM, [employer] Skin Discoloration Defense in PM.  - Proceed with glow facial with Marry.    # Hx melanoma (unknown path), nasal bridge, s/p MMS 2014.  - Pt sees Dr. Chase Sherman.  - Recommend continued sun precaution behaviors including the use of sunscreens of SPF 50 or higher, sun protective clothing, and avoidance of tanning beds.    Procedures Performed:   See PDL note below.    Follow-up:   3-4 weeks glow facial with nurse  6 months in-person cosmetic or earlier for new or changing lesions    Staff and Scribe:     Scribe Disclosure:   Lay PIPER (MS4), am serving as a scribe to document services personally performed by this physician,  Dr. Lorena Gomes, based on data collection and the provider's statements to me.      Scribe Disclosure:   I, Joya Lakhani, am serving as a scribe to document services personally performed by Lorena Gomes MD based on data collection and the provider's statements to me.       Provider Disclosure:   The documentation recorded by the scribe accurately reflects the services I personally performed and the decisions made by me.    Lorena Gomes MD    Department of Dermatology  [facility]: Phone: [phone], Fax:[phone]  [facility]: Phone: [phone], Fax: [phone]   ____________________________________________    CC: Derm Problem (Capri is here for for rosacea and is interested in PDL of nose. )    HPI:  Ms. Capri Simpson is a(n) 73 year old female who presents today as a new patient for rosacea consultation.    Last seen by Dr. Chase Sherman on 3/15/23, where treatment options for rosacea were discussed, including prescription topical therapies and PDL. Was referred to us for cosmetic treatment.     Today, she reports she is occasionally bothered by facial redness. Currently using hyaluronic acid cream, but she believes it is possibly exacerbating redness. She used to take care of her skin more frequently, but she has let it go lately. She is also slightly bothered by the nasal scar after MMS performed ~2014. Also still using Elidel cream for redness. Suncreen is SuperGoop! mineral sunscreen along with a Malaysian sunscreen.    Patient is otherwise feeling well, without additional skin concerns.    Labs Reviewed:  N/A    Physical Exam:  Vitals: LMP  (LMP Unknown)   SKIN: Focused examination of face was performed.  - Scattered telangiectasias over the nose, cheeks, and nasal ala.   - Scattered brown macules on sun exposed areas of the face.   - Well-healed surgical scar on nose.  -  No other lesions of concern on areas examined.     Medications:  Current Outpatient Medications   Medication    azelaic acid (FINACIA) 15 % external gel    Calcium-Vitamin D 500-3.125 MG-MCG TABS    pimecrolimus (ELIDEL) 1 % external cream     No current facility-administered medications for this visit.      Past Medical History:   Patient Active Problem List   Diagnosis    History of melanoma    Age-related osteoporosis without current pathological fracture    Neck pain     Past Medical History:   Diagnosis Date    Melanoma of skin (H)     On tip of nose, removed by dermatologist. Dr Escobar    Osteoporosis     2019 and 2023        CC No referring provider defined for this encounter. on close of this encounter.

## 2023-10-25 ENCOUNTER — OFFICE VISIT (OUTPATIENT)
Dept: DERMATOLOGY | Facility: CLINIC | Age: 73
End: 2023-10-25
Payer: MEDICARE

## 2023-10-25 DIAGNOSIS — L81.4 SOLAR LENTIGO: ICD-10-CM

## 2023-10-25 DIAGNOSIS — L71.9 ROSACEA: Primary | ICD-10-CM

## 2023-10-25 DIAGNOSIS — Z85.820 HISTORY OF MELANOMA: ICD-10-CM

## 2023-10-25 PROCEDURE — 96999 UNLISTED SPEC DERM SVC/PX: CPT | Performed by: DERMATOLOGY

## 2023-10-25 ASSESSMENT — PAIN SCALES - GENERAL: PAINLEVEL: NO PAIN (0)

## 2023-10-25 NOTE — LETTER
10/25/2023       RE: Capri Simpson  [address]  [facility] [address]     Dear Colleague,    Thank you for referring your patient, Capri Simpson, to the [facility]ERMATOLOG[facility] [place] at [facility]. Please see a copy of my visit note below.    [facility] Dermatology Note  Encounter Date: Oct 25, 2023  Office Visit     Dermatology Problem List:  1. Hx melanoma, nasal bridge, s/p MMS (?2014) in [place]  - MILKA signed  2. Rosacea, ET Type, nose, cheeks, nasal ala.  - Current Tx: PDL 10/25/23, Finacea in AM, SkinCeuticals Skin Discoloration Defense in PM  3. Scar, nose, S/P MMS after melanoma dx  - Current Tx: PDL 10/25/23    ____________________________________________    Assessment & Plan:    # ET type rosacea, nose. Chronic, flare. Cosmetic referral from Dr. Chase Sherman. Discussed principal of pulse dye laser therapy for rosacea and expectation of 20% improvement per treatment.  Discussed to expect between 3-6 treatments spaced 4 weeks apart and then approximately every year maintenance therapy.  Discussed that this considered a cosmetic procedure and is generally an out of pocket expense.  Discussed risk of bruising (common) and blistering/scarring (rare).  Discussed the importance of daily sun protection. Also discussed risks and benefits of alternative treatments, such as glow facial, chemical peels, microblading, and changing skin care regimen. Patient is agreeable to PDL today, glow facial, and updating her skin care regimen.  - PDL performed today. See procedure note.  - Start Finacea in AM, SkinCeuticals Skin Discoloration Defense in PM.  - Proceed with glow facial with Marry.    # Hx melanoma (unknown path), nasal bridge, s/p MMS 2014.  - Pt sees Dr. Chase Sherman.  - Recommend continued sun precaution behaviors including the use of sunscreens of SPF 50 or higher, sun protective clothing, and  avoidance of tanning beds.    Procedures Performed:   See PDL note below.    Follow-up:   3-4 weeks glow facial with nurse  6 months in-person cosmetic or earlier for new or changing lesions    Staff and Scribe:     Scribe Disclosure:   I, Lay SIMENTALMyesha Rapp (MS4), am serving as a scribe to document services personally performed by this physician, Dr. Lorena Gomes, based on data collection and the provider's statements to me.      Scribe Disclosure:   I, Joya Lakhani, am serving as a scribe to document services personally performed by Lorena Gomes MD based on data collection and the provider's statements to me.       Provider Disclosure:   The documentation recorded by the scribe accurately reflects the services I personally performed and the decisions made by me.    Lorena Gomes MD    Department of Dermatology  [facility]: Phone: [phone], Fax:[phone]  [facility]: Phone: [phone], Fax: [phone]   ____________________________________________    CC: Derm Problem (Capri is here for for rosacea and is interested in PDL of nose. )    HPI:  Ms. Capri Simpson is a(n) 73 year old female who presents today as a new patient for rosacea consultation.    Last seen by Dr. Chase Sherman on 3/15/23, where treatment options for rosacea were discussed, including prescription topical therapies and PDL. Was referred to us for cosmetic treatment.     Today, she reports she is occasionally bothered by facial redness. Currently using hyaluronic acid cream, but she believes it is possibly exacerbating redness. She used to take care of her skin more frequently, but she has let it go lately. She is also slightly bothered by the nasal scar after MMS performed ~2014. Also still using Elidel cream for redness. Suncreen is SuperGoop! mineral sunscreen along with a [place] sunscreen.    Patient is  otherwise feeling well, without additional skin concerns.    Labs Reviewed:  N/A    Physical Exam:  Vitals: LMP  (LMP Unknown)   SKIN: Focused examination of face was performed.  - Scattered telangiectasias over the nose, cheeks, and nasal ala.   - Scattered brown macules on sun exposed areas of the face.   - Well-healed surgical scar on nose.  - No other lesions of concern on areas examined.     Medications:  Current Outpatient Medications   Medication    azelaic acid (FINACIA) 15 % external gel    Calcium-Vitamin D 500-3.125 MG-MCG TABS    pimecrolimus (ELIDEL) 1 % external cream     No current facility-administered medications for this visit.      Past Medical History:   Patient Active Problem List   Diagnosis    History of melanoma    Age-related osteoporosis without current pathological fracture    Neck pain     Past Medical History:   Diagnosis Date    Melanoma of skin (H)     On tip of nose, removed by dermatologist. Dr Escobar    Osteoporosis     2019 and 2023        CC No referring provider defined for this encounter. on close of this encounter.     Laser- VBeam(Pulsed Dye Laser) Procedure Note: Cosmetic      Dermatology Problem List:  1. Hx melanoma, nasal bridge, s/p MMS (?2014) in [facility]  - MILKA signed  2. Rosacea, ET Type, nose, cheeks, nasal ala.  - Current Tx: PDL 10/25/23, Finacea in AM, SkinCeuticals Skin Discoloration Defense in PM  3. Scar, nose, S/P MMS after melanoma dx  - Current Tx: PDL 10/25/23    Interval history- pt want vessels and scar on nose treated.     Procedure Date: Oct 25, 2023    Attending Staff Surgeon: Dr. Lorena Gomes    Assistant: Lisha Scott CMA    Operating Room Data:     Surgery/Procedure Date:    SAME     Pre-operative Diagnosis:   Rosacea and Scar  Location: Nose    Operation/Procedure    Vbeam pulsed dye laser treatment#: 1    Post-operative Diagnosis:  SAME    Laser Settings:  Nose  Energy:7 J/cm2  Spot size:7mm  Pulse width:  6 mS (0.45 thru 40 mS)  Dynamic cooling  spray settin mS  Dynamic cooling device delay:  20 mS    Scar on nose  Energy:4 J/cm2  Spot size:7mm  Pulse width:  1.5 mS (0.45 thru 40 mS)  Dynamic cooling spray settin mS  Dynamic cooling device delay:  20 mS      Fotofinder photos: No    Anesthesia:  None    Description of Operation/Procedure:   The nature and purpose of the procedure, associated risks, possible consequences, complications and alternative methods of treatment were explained in detail, this includes but is not limited to hyperpigmentation, hypopigmentation, scarring, bruising,  /discomfort, eye injury, ,  and blister. We reviewed that the outcome could be any of the following: no improvement, slight improvement or change in skin color & texture, the skin might be permanently lighter or darker, and though uncommon, superficial scarring may occur.  Multiple treatments may be recommended.     A photo and operative consent were obtained. Time-out was performed.The patient was positioned to optimally expose the area treated. Protective eyewear was worn by the patient and goggles on all personnel in the treatment room. The patient confirmed the site to be treated. The laser energy output was verified by meter reading.      The clinically evident lesion(s) was/were treated with Patito Vbeam pulsed dye laser (595 nm) beam as above. A total of 12 pulses were used. The patient tolerated the procedure well and no complications were noted. Post operative instructions were provided. The total laser operation and preparation time was <5 minutes.  The patient was counseled to call immediately for any issues and read the after visit summary for emergency contact information. The patient was counseled that [employer] messaging response may be delayed by several days, therefore, phone is preferred for emergency issues.             Staff Involved:  Scribe/Staff    Scribe Disclosure:   Joya PIPER, am serving as a scribe to document services personally  performed by Lorena Gomes MD based on data collection and the provider's statements to me.     Provider Disclosure:   The documentation recorded by the scribe accurately reflects the services I personally performed and the decisions made by me.    Lorena Gomes MD    Department of Dermatology  [facility]: Phone: [phone], Fax:[phone]  [facility]: Phone: [phone], Fax: [phone]

## 2023-10-25 NOTE — PROGRESS NOTES
Laser- VBeam(Pulsed Dye Laser) Procedure Note: Cosmetic      Dermatology Problem List:  1. Hx melanoma, nasal bridge, s/p MMS (?2014) in [facility]  - MILKA signed  2. Rosacea, ET Type, nose, cheeks, nasal ala.  - Current Tx: PDL 10/25/23, Finacea in AM, SkinCeuticals Skin Discoloration Defense in PM  3. Scar, nose, S/P MMS after melanoma dx  - Current Tx: PDL 10/25/23    Interval history- pt want vessels and scar on nose treated.     Procedure Date: Oct 25, 2023    Attending Staff Surgeon: Dr. Lorena Gomes    Assistant: Lisha Scott CMA    Operating Room Data:     Surgery/Procedure Date:    SAME     Pre-operative Diagnosis:   Rosacea and Scar  Location: Nose    Operation/Procedure    Vbeam pulsed dye laser treatment#: 1    Post-operative Diagnosis:  SAME    Laser Settings:  Nose  Energy:7 J/cm2  Spot size:7mm  Pulse width:  6 mS (0.45 thru 40 mS)  Dynamic cooling spray settin mS  Dynamic cooling device delay:  20 mS    Scar on nose  Energy:4 J/cm2  Spot size:7mm  Pulse width:  1.5 mS (0.45 thru 40 mS)  Dynamic cooling spray settin mS  Dynamic cooling device delay:  20 mS      Fotofinder photos: No    Anesthesia:  None    Description of Operation/Procedure:   The nature and purpose of the procedure, associated risks, possible consequences, complications and alternative methods of treatment were explained in detail, this includes but is not limited to hyperpigmentation, hypopigmentation, scarring, bruising,  /discomfort, eye injury, ,  and blister. We reviewed that the outcome could be any of the following: no improvement, slight improvement or change in skin color & texture, the skin might be permanently lighter or darker, and though uncommon, superficial scarring may occur.  Multiple treatments may be recommended.     A photo and operative consent were obtained. Time-out was performed.The patient was positioned to optimally expose the area treated. Protective eyewear was worn by the patient and goggles on  all personnel in the treatment room. The patient confirmed the site to be treated. The laser energy output was verified by meter reading.      The clinically evident lesion(s) was/were treated with Patito Vbeam pulsed dye laser (595 nm) beam as above. A total of 12 pulses were used. The patient tolerated the procedure well and no complications were noted. Post operative instructions were provided. The total laser operation and preparation time was <5 minutes.  The patient was counseled to call immediately for any issues and read the after visit summary for emergency contact information. The patient was counseled that [employer] messaging response may be delayed by several days, therefore, phone is preferred for emergency issues.             Staff Involved:  Scribe/Staff    Scribe Disclosure:   I, Joya Lakhani, am serving as a scribe to document services personally performed by Lorena Gomes MD based on data collection and the provider's statements to me.     Provider Disclosure:   The documentation recorded by the scribe accurately reflects the services I personally performed and the decisions made by me.    Lorena Gomes MD    Department of Dermatology  [facility]: Phone: [phone], Fax:[phone]  [facility]: Phone: [phone], Fax: [phone]

## 2023-10-31 ENCOUNTER — MYC MEDICAL ADVICE (OUTPATIENT)
Dept: FAMILY MEDICINE | Facility: CLINIC | Age: 73
End: 2023-10-31

## 2023-11-09 ENCOUNTER — VIRTUAL VISIT (OUTPATIENT)
Dept: PHARMACY | Facility: CLINIC | Age: 73
End: 2023-11-09
Attending: FAMILY MEDICINE
Payer: COMMERCIAL

## 2023-11-09 DIAGNOSIS — M81.0 AGE-RELATED OSTEOPOROSIS WITHOUT CURRENT PATHOLOGICAL FRACTURE: Primary | ICD-10-CM

## 2023-11-09 PROCEDURE — 99207 PR NO CHARGE LOS: CPT | Performed by: PHARMACIST

## 2023-11-09 RX ORDER — CHOLECALCIFEROL (VITAMIN D3) 50 MCG
1 TABLET ORAL DAILY
COMMUNITY

## 2023-11-09 NOTE — PROGRESS NOTES
Disease State Management Encounter:                          Capri Simpson is a 73 year old female called for an initial visit. She was referred to me from Olimpia Lambert MD.     Reason for visit: osteoporosis.     Osteoporosis:   Calcium - plans to switch to 500-600mg pills  Vitamin D 2072-5328 units daily  Has a hard time remembering to take pills, prefers an injection if possible. Has known people with side effects to Fosamax and prefers not to take this.     DEXA History:     Patient is getting  2-3 serving(s)/day of calcium in their diet.  Risk factors: post-menopausal  family history of osteoporosis  Last vitamin D level:  Lab Results   Component Value Date    VITDT 48 02/16/2023     Upcoming invasive dental work: Going to have an implant. Appointment next week to discuss further with dentist.   Osteoporosis family history- mother? No fracture.  Sister also has osteoporosis, uses Reclast with good benefit.   No fracture history.    Today's Vitals: LMP  (LMP Unknown)     Assessment/Plan:    Education provided on options, reviewed anabolic vs bone maintenance and she is leaning towards an IV bisphosphonate option (Reclast).  Encouraged her to further discuss with her dentist at her upcoming appointment - if she is considered low risk for ONJ, she can consider starting Reclast now with the procedure planned for abouut 6 months from now. Alternatively she will consider waiting for her implant and healing, then scheduling Reclast at that time (6-12 months from now)    Osteoporosis Risk Score/FRAX score    [email or web address]  Risk of Hip Fracture: 6.9 (Significant if >3%)  Risk of Overall Fracture: 19 (Significant if >20%)    Take 1 calcium pill of 500-600mg per day in addition to the calcium in diet.  Continue vitamin D    Follow-up: via [email or web address] in 2-3 weeks    I spent 30 minutes with this patient today. All changes were made via collaborative practice agreement with Olimpia Lambert MD. A copy of the visit  note was provided to the patient's provider(s).    A summary of these recommendations was sent via [employer].    Claudia Oropeza, PharmD, BCACP   Medication Therapy Management Pharmacist   [facility]  [phone]          Medication Therapy Recommendations  No medication therapy recommendations to display

## 2023-11-09 NOTE — PATIENT INSTRUCTIONS
Recommendations from today's disease management visit:                                                      Consider starting Reclast - this is an infusion for the bones taken once a year.  Please talk to your dentist about your risk to the jaw bone with this medication. Think about if you would like to take Reclast now or if you prefer to wait until you have had the implant completed and healed sufficiently.     Follow-up: Please call or send me a message about your conversation with the dentist.  If I don't hear back from you, I'll reach out again in a couple weeks to check on your decision.     To schedule another MTM appointment, please call the clinic directly or you may call the MTM scheduling line at [phone] or toll-free at [phone].     My Clinical Pharmacist's contact information:                                                      Please feel free to contact me with any questions or concerns you have.      Claudia Oropeza, PharmD, BCACP   Medication Therapy Management Pharmacist   [facility]  [phone]

## 2023-11-13 ENCOUNTER — TELEPHONE (OUTPATIENT)
Dept: DERMATOLOGY | Facility: CLINIC | Age: 73
End: 2023-11-13
Payer: MEDICARE

## 2023-11-13 NOTE — TELEPHONE ENCOUNTER
sent to Marry in Derm to call and rescheduled the following:    Appointment type: Cosmetic return  Provider: Dr. Gomes  Return date: 11-22-23  Specialty phone number: [phone]

## 2023-12-21 PROBLEM — M54.2 NECK PAIN: Status: RESOLVED | Noted: 2023-10-18 | Resolved: 2023-12-21

## 2023-12-21 NOTE — PROGRESS NOTES
DISCHARGE SUMMARY    Capri Simpson was seen   times for evaluation and treatment.  Patient did not return for further treatment and current status is unknown.  Due to short treatment duration, no objective or functional changes were made.  Please see goal flow sheet from episode noted date below and initial evaluation for further information.  Patient is discharged from therapy and therapy episode is resolved as of 12/21/23.      Linked Episodes   Type: Episode: Status: Noted: Resolved: Last update: Updated by:   PHYSICAL THERAPY neck [id number] Active 10/18/2023  10/18/2023  1:30 PM Chelly Field, PT      Comments:

## 2024-04-10 ENCOUNTER — OFFICE VISIT (OUTPATIENT)
Dept: FAMILY MEDICINE | Facility: CLINIC | Age: 74
End: 2024-04-10
Payer: MEDICARE

## 2024-04-10 VITALS
HEIGHT: 62 IN | HEART RATE: 67 BPM | OXYGEN SATURATION: 96 % | TEMPERATURE: 97.3 F | BODY MASS INDEX: 23.19 KG/M2 | WEIGHT: 126 LBS | SYSTOLIC BLOOD PRESSURE: 100 MMHG | DIASTOLIC BLOOD PRESSURE: 67 MMHG

## 2024-04-10 DIAGNOSIS — Z23 NEED FOR VACCINATION: ICD-10-CM

## 2024-04-10 DIAGNOSIS — R53.83 FEELING TIRED: ICD-10-CM

## 2024-04-10 DIAGNOSIS — M81.0 AGE-RELATED OSTEOPOROSIS WITHOUT CURRENT PATHOLOGICAL FRACTURE: ICD-10-CM

## 2024-04-10 DIAGNOSIS — Z00.00 ROUTINE GENERAL MEDICAL EXAMINATION AT A HEALTH CARE FACILITY: Primary | ICD-10-CM

## 2024-04-10 DIAGNOSIS — L71.9 ROSACEA: ICD-10-CM

## 2024-04-10 LAB
ERYTHROCYTE [DISTWIDTH] IN BLOOD BY AUTOMATED COUNT: 13.1 % (ref 10–15)
HCT VFR BLD AUTO: 43.7 % (ref 35–47)
HGB BLD-MCNC: 13.6 G/DL (ref 11.7–15.7)
MCH RBC QN AUTO: 26.9 PG (ref 26.5–33)
MCHC RBC AUTO-ENTMCNC: 31.1 G/DL (ref 31.5–36.5)
MCV RBC AUTO: 87 FL (ref 78–100)
PLATELET # BLD AUTO: 247 10E3/UL (ref 150–450)
RBC # BLD AUTO: 5.05 10E6/UL (ref 3.8–5.2)
TSH SERPL DL<=0.005 MIU/L-ACNC: 1.86 UIU/ML (ref 0.3–4.2)
WBC # BLD AUTO: 6.9 10E3/UL (ref 4–11)

## 2024-04-10 PROCEDURE — 84443 ASSAY THYROID STIM HORMONE: CPT | Mod: ORL | Performed by: FAMILY MEDICINE

## 2024-04-10 RX ORDER — AZELAIC ACID 0.15 G/G
GEL TOPICAL 2 TIMES DAILY
Qty: 50 G | Refills: 2 | Status: SHIPPED | OUTPATIENT
Start: 2024-04-10

## 2024-04-10 SDOH — HEALTH STABILITY: PHYSICAL HEALTH: ON AVERAGE, HOW MANY DAYS PER WEEK DO YOU ENGAGE IN MODERATE TO STRENUOUS EXERCISE (LIKE A BRISK WALK)?: 5 DAYS

## 2024-04-10 ASSESSMENT — SOCIAL DETERMINANTS OF HEALTH (SDOH): HOW OFTEN DO YOU GET TOGETHER WITH FRIENDS OR RELATIVES?: THREE TIMES A WEEK

## 2024-04-10 NOTE — NURSING NOTE
Prior to immunization administration, verified patients identity using patient s name and date of birth. Please see Immunization Activity for additional information.     Screening Questionnaire for Adult Immunization    Are you sick today?   No   Do you have allergies to medications, food, a vaccine component or latex?   No   Have you ever had a serious reaction after receiving a vaccination?   No   Do you have a long-term health problem with heart, lung, kidney, or metabolic disease (e.g., diabetes), asthma, a blood disorder, no spleen, complement component deficiency, a cochlear implant, or a spinal fluid leak?  Are you on long-term aspirin therapy?   No   Do you have cancer, leukemia, HIV/AIDS, or any other immune system problem?   No   Do you have a parent, brother, or sister with an immune system problem?   No   In the past 3 months, have you taken medications that affect  your immune system, such as prednisone, other steroids, or anticancer drugs; drugs for the treatment of rheumatoid arthritis, Crohn s disease, or psoriasis; or have you had radiation treatments?   No   Have you had a seizure, or a brain or other nervous system problem?   No   During the past year, have you received a transfusion of blood or blood    products, or been given immune (gamma) globulin or antiviral drug?   No   For women: Are you pregnant or is there a chance you could become       pregnant during the next month?   No   Have you received any vaccinations in the past 4 weeks?   No     Immunization questionnaire answers were all negative.      Patient instructed to remain in clinic for 15 minutes afterwards, and to report any adverse reactions.     Screening performed by Marcia Villanueva MA on 4/10/2024 at 9:26 AM.

## 2024-04-10 NOTE — PATIENT INSTRUCTIONS
[facility]   Preventive Care Advice   This is general advice given by our system to help you stay healthy. However, your care team may have specific advice just for you. Please talk to your care team about your preventive care needs.  Nutrition  Eat 5 or more servings of fruits and vegetables each day.  Try wheat bread, brown rice and whole grain pasta (instead of white bread, rice, and pasta).  Get enough calcium and vitamin D. Check the label on foods and aim for 100% of the RDA (recommended daily allowance).  Lifestyle  Exercise at least 150 minutes each week   (30 minutes a day, 5 days a week).  Do muscle strengthening activities 2 days a week. These help control your weight and prevent disease.  No smoking.  Wear sunscreen to prevent skin cancer.  Have a dental exam and cleaning every 6 months.  Yearly exams  See your health care team every year to talk about:  Any changes in your health.  Any medicines your care team has prescribed.  Preventive care, family planning, and ways to prevent chronic diseases.  Shots (vaccines)   HPV shots (up to age 26), if you've never had them before.  Hepatitis B shots (up to age 59), if you've never had them before.  COVID-19 shot: Get this shot when it's due.  Flu shot: Get a flu shot every year.  Tetanus shot: Get a tetanus shot every 10 years.  Pneumococcal, hepatitis A, and RSV shots: Ask your care team if you need these based on your risk.  Shingles shot (for age 50 and up).  General health tests  Diabetes screening:  Starting at age 35, Get screened for diabetes at least every 3 years.  If you are younger than age 35, ask your care team if you should be screened for diabetes.  Cholesterol test: At age 39, start having a cholesterol test every 5 years, or more often if advised.  Bone density scan (DEXA): At age 50, ask your care team if you should have this scan for osteoporosis (brittle bones).  Hepatitis C: Get tested at least once in your life.  STIs  (sexually transmitted infections)  Before age 24: Ask your care team if you should be screened for STIs.  After age 24: Get screened for STIs if you're at risk. You are at risk for STIs (including HIV) if:  You are sexually active with more than one person.  You don't use condoms every time.  You or a partner was diagnosed with a sexually transmitted infection.  If you are at risk for HIV, ask about PrEP medicine to prevent HIV.  Get tested for HIV at least once in your life, whether you are at risk for HIV or not.  Cancer screening tests  Cervical cancer screening: If you have a cervix, begin getting regular cervical cancer screening tests at age 21. Most people who have regular screenings with normal results can stop after age 65. Talk about this with your provider.  Breast cancer scan (mammogram): If you've ever had breasts, begin having regular mammograms starting at age 40. This is a scan to check for breast cancer.  Colon cancer screening: It is important to start screening for colon cancer at age 45.  Have a colonoscopy test every 10 years (or more often if you're at risk) Or, ask your provider about stool tests like a FIT test every year or Cologuard test every 3 years.  To learn more about your testing options, visit: https://www.[email or web address]/682925.pdf.  For help making a decision, visit: https://bit.ly/ao69439.  Prostate cancer screening test: If you have a prostate and are age 55 to 69, ask your provider if you would benefit from a yearly prostate cancer screening test.  Lung cancer screening: If you are a current or former smoker age 50 to 80, ask your care team if ongoing lung cancer screenings are right for you.  For informational purposes only. Not to replace the advice of your health care provider. Copyright   2023 [employer]. All rights reserved. Clinically reviewed by the [facility]. HereOrThere 045579 - REV 01/24.    Learning About Sleeping Well  What does  sleeping well mean?     Sleeping well means getting enough sleep to feel good and stay healthy. How much sleep is enough varies among people.  The number of hours you sleep and how you feel when you wake up are both important. If you do not feel refreshed, you probably need more sleep. Another sign of not getting enough sleep is feeling tired during the day.  Experts recommend that adults get at least 7 or more hours of sleep per day. Children and older adults need more sleep.  Why is getting enough sleep important?  Getting enough quality sleep is a basic part of good health. When your sleep suffers, your physical health, mood, and your thoughts can suffer too. You may find yourself feeling more grumpy or stressed. Not getting enough sleep also can lead to serious problems, including injury, accidents, anxiety, and depression.  What might cause poor sleeping?  Many things can cause sleep problems, including:  Changes to your sleep schedule.  Stress. Stress can be caused by fear about a single event, such as giving a speech. Or you may have ongoing stress, such as worry about work or school.  Depression, anxiety, and other mental or emotional conditions.  Changes in your sleep habits or surroundings. This includes changes that happen where you sleep, such as noise, light, or sleeping in a different bed. It also includes changes in your sleep pattern, such as having jet lag or working a late shift.  Health problems, such as pain, breathing problems, and restless legs syndrome.  Lack of regular exercise.  Using alcohol, nicotine, or caffeine before bed.  How can you help yourself?  Here are some tips that may help you sleep more soundly and wake up feeling more refreshed.  Your sleeping area   Use your bedroom only for sleeping and sex. A bit of light reading may help you fall asleep. But if it doesn't, do your reading elsewhere in the house. Try not to use your TV, computer, smartphone, or tablet while you are in  "bed.  Be sure your bed is big enough to stretch out comfortably, especially if you have a sleep partner.  Keep your bedroom quiet, dark, and cool. Use curtains, blinds, or a sleep mask to block out light. To block out noise, use earplugs, soothing music, or a \"white noise\" machine.  Your evening and bedtime routine   Create a relaxing bedtime routine. You might want to take a warm shower or bath, or listen to soothing music.  Go to bed at the same time every night. And get up at the same time every morning, even if you feel tired.  What to avoid   Limit caffeine (coffee, tea, caffeinated sodas) during the day, and don't have any for at least 6 hours before bedtime.  Avoid drinking alcohol before bedtime. Alcohol can cause you to wake up more often during the night.  Try not to smoke or use tobacco, especially in the evening. Nicotine can keep you awake.  Limit naps during the day, especially close to bedtime.  Avoid lying in bed awake for too long. If you can't fall asleep or if you wake up in the middle of the night and can't get back to sleep within about 20 minutes, get out of bed and go to another room until you feel sleepy.  Avoid taking medicine right before bed that may keep you awake or make you feel hyper or energized. Your doctor can tell you if your medicine may do this and if you can take it earlier in the day.  If you can't sleep   Imagine yourself in a peaceful, pleasant scene. Focus on the details and feelings of being in a place that is relaxing.  Get up and do a quiet or boring activity until you feel sleepy.  Avoid drinking any liquids before going to bed to help prevent waking up often to use the bathroom.  Where can you learn more?  Go to https://www.[employer].net/patiented  Enter J942 in the search box to learn more about \"Learning About Sleeping Well.\"  Current as of: July 10, 2023               Content Version: 14.0    0711-6093 [employer].   Care instructions adapted under " license by your healthcare professional. If you have questions about a medical condition or this instruction, always ask your healthcare professional. Healthwise[employer] Inc[employer] disclaims any warranty or liability for your use of this information.

## 2024-04-10 NOTE — PROGRESS NOTES
Preventive Care Visit  [facility]  Olimpia Lambert MD, Family Medicine  Apr 10, 2024      Assessment & Plan     Routine general medical examination at a health care facility  She would like to continue with screening colonoscopies, next due in 2027.   She is going to check her records about her last breast cancer screening.   Prevnar 20 today, other vaccines should be given at pharmacy.   She can check with her insurance about seeing [facility] for her history of melanoma and rosacea as well as skin rash on her chest. In the meantime, Finacia refilled for her to use prn.     Age-related osteoporosis without current pathological fracture  She plans to start prescription treatment this summer after her dental implant.     Feeling tired  Will r/o anemia and thyroid dysfunction as cause of her symptoms. Okay to take a short nap every day if she feels refreshed afterwards.   - CBC with platelets  - TSH with free T4 reflex      Counseling  Appropriate preventive services were discussed with this patient, including applicable screening as appropriate for fall prevention, nutrition, physical activity, Tobacco-use cessation, weight loss and cognition.  Checklist reviewing preventive services available has been given to the patient.  Reviewed patient's diet, addressing concerns and/or questions.   Discussed possible causes of fatigue.     Return in about 1 year (around 4/10/2025) for physical.    Seymour Farooq is a 74 year old, presenting for the following:  Wellness Visit (Rash in chest area and has been tired alot lately )    Health Care Directive  Patient does not have a Health Care Directive or Living Will: Discussed at last visit in 2/2023    HPI  Having a dental implant done in June, dentist recommended waiting to start osteoporosis medications. So after her implant, she plans on scheduling with Claudia Oropeza at [facility] to discuss bone density medications. Has started weight bearing exercise.  Taking calcium & vitamin D.    History of melanoma - Saw dermatology in 10/2023. Wasn't super happy with her experience. Wants a new dermatology recommendation.   Has intermittently noted a rash on her chest, it's not present today. Pruritic, occasionally burning. Uses topical Benadryl as needed. Has been on and off for about a month. She was wearing a gold necklace with a pendant on the chest, but took it off in case that was irritating her chest.      Recently feeling a bit more tired than she used to around 11am or noon. Will ride it out and then will be okay. Wondering if she isn't eating a big enough breakfast. Has always been someone who naps, will take a deep 15 min nap and then will feel refreshed.         4/10/2024   General Health   How would you rate your overall physical health? Excellent   Feel stress (tense, anxious, or unable to sleep) Not at all         4/10/2024   Nutrition   Diet: Other   If other, please elaborate: Pesc[place]ian         4/10/2024   Exercise   Days per week of moderate/strenous exercise 5 days         4/10/2024   Social Factors   Frequency of gathering with friends or relatives Three times a week   Worry food won't last until get money to buy more No   Food not last or not have enough money for food? No   Do you have housing?  Yes   Are you worried about losing your housing? No   Lack of transportation? No   Unable to get utilities (heat,electricity)? No         4/10/2024   Fall Risk   Fallen 2 or more times in the past year? No    No   Trouble with walking or balance? No    No          4/10/2024   Activities of Daily Living- Home Safety   Needs help with the following daily activites None of the above   Safety concerns in the home None of the above         4/10/2024   Dental   Dentist two times every year? Yes         4/10/2024   Hearing Screening   Hearing concerns? None of the above         4/10/2024   Driving Risk Screening   Patient/family members have concerns about driving No          4/10/2024   General Alertness/Fatigue Screening   Have you been more tired than usual lately? (!) YES         4/10/2024   Urinary Incontinence Screening   Bothered by leaking urine in past 6 months No         4/10/2024   TB Screening   Were you born outside of the [place]? Yes       Today's PHQ-2 Score:       4/10/2024     8:42 AM   PHQ-2 ( 1999 Pf[employer])   Q1: Little interest or pleasure in doing things 0   Q2: Feeling down, depressed or hopeless 0   PHQ-2 Score 0   Q1: Little interest or pleasure in doing things Not at all   Q2: Feeling down, depressed or hopeless Not at all   PHQ-2 Score 0           4/10/2024   Substance Use   Alcohol more than 3/day or more than 7/wk Not Applicable   Do you have a current opioid prescription? No   How severe/bad is pain from 1 to 10? 0/10 (No Pain)   Do you use any other substances recreationally? No     Social History     Tobacco Use    Smoking status: Never    Smokeless tobacco: Never   Vaping Use    Vaping status: Never Used   Substance Use Topics    Alcohol use: Not Currently    Drug use: Never        Mammogram Screening - After age 74- determine frequency with patient based on health status, life expectancy and patient goals    ASCVD Risk   The 10-year ASCVD risk score (Liam DK, et al., 2019) is: 11.5%    Values used to calculate the score:      Age: 74 years      Sex: Female      Is Non- : No      Diabetic: No      Tobacco smoker: No      Systolic Blood Pressure: 100 mmHg      Is BP treated: No      HDL Cholesterol: 67 mg/dL      Total Cholesterol: 195 mg/dL    Reviewed and updated as needed this visit by Provider   Tobacco    Problems  Med Hx  Surg Hx  Fam Hx  Soc Hx Sexual Activity            Current providers sharing in care for this patient include:  Patient Care Team:  Olimpia Lambert MD as PCP - General (Family Medicine)  Dallas Guerrero MD as Resident (Student in organized health care education/training program)  Eliseo  "MD Lorena as MD (Dermatology)  Claudia Oropeza [facility] as Pharmacist (Pharmacist)  Lorena Gomes MD as Assigned Surgical Provider  [facility] [place] as Assigned PCP    The following health maintenance items are reviewed in Epic and correct as of today:  Health Maintenance   Topic Date Due    DEXA  Never done    HEPATITIS C SCREENING  Never done    DTAP/TDAP/TD IMMUNIZATION (1 - Tdap) Never done    ZOSTER IMMUNIZATION (1 of 2) Never done    RSV VACCINE (Pregnancy & 60+) (1 - 1-dose 60+ series) Never done    Pneumococcal Vaccine: 65+ Years (1 of 1 - PCV) Never done    INFLUENZA VACCINE (1) Never done    COVID-19 Vaccine (1 - 2023-24 season) Never done    MEDICARE ANNUAL WELLNESS VISIT  04/10/2025    FALL RISK ASSESSMENT  04/10/2025    GLUCOSE  10/11/2026    LIPID  02/16/2028    ADVANCE CARE PLANNING  02/16/2028    PHQ-2 (once per calendar year)  Completed    IPV IMMUNIZATION  Aged Out    HPV IMMUNIZATION  Aged Out    MENINGITIS IMMUNIZATION  Aged Out    RSV MONOCLONAL ANTIBODY  Aged Out        Objective    Exam  /67   Pulse 67   Temp 97.3  F (36.3  C)   Ht 1.585 m (5' 2.4\")   Wt 57.2 kg (126 lb)   LMP  (LMP Unknown)   SpO2 96%   BMI 22.75 kg/m     Estimated body mass index is 22.75 kg/m  as calculated from the following:    Height as of this encounter: 1.585 m (5' 2.4\").    Weight as of this encounter: 57.2 kg (126 lb).    Physical Exam  GENERAL: alert and no distress  EYES: Eyes grossly normal to inspection, PERRL and conjunctivae and sclerae normal  HENT: ear canals and TM's normal, nose and mouth without ulcers or lesions  NECK: no adenopathy, no asymmetry, masses, or scars  RESP: lungs clear to auscultation - no rales, rhonchi or wheezes  BREAST: normal without masses, tenderness or nipple discharge and no palpable axillary masses or adenopathy  CV: regular rate and rhythm, normal S1 S2, no S3 or S4, no murmur, click or rub, no peripheral edema  ABDOMEN: soft, nontender, without " hepatosplenomegaly or masses  MS: no gross musculoskeletal defects noted, no edema  SKIN: no suspicious lesions or rashes  NEURO: Normal strength and tone, mentation intact and speech normal  PSYCH: mentation appears normal, affect normal/bright        4/10/2024   Mini Cog   Clock Draw Score 0 Abnormal   3 Item Recall 2 objects recalled   Mini Cog Total Score 2              Signed Electronically by: Olimpia Lambert MD

## 2024-04-17 ENCOUNTER — MYC MEDICAL ADVICE (OUTPATIENT)
Dept: FAMILY MEDICINE | Facility: CLINIC | Age: 74
End: 2024-04-17

## 2024-06-06 ENCOUNTER — OFFICE VISIT (OUTPATIENT)
Dept: FAMILY MEDICINE | Facility: CLINIC | Age: 74
End: 2024-06-06
Payer: MEDICARE

## 2024-06-06 VITALS
OXYGEN SATURATION: 96 % | SYSTOLIC BLOOD PRESSURE: 109 MMHG | HEART RATE: 76 BPM | DIASTOLIC BLOOD PRESSURE: 56 MMHG | TEMPERATURE: 98 F | BODY MASS INDEX: 23.19 KG/M2 | HEIGHT: 62 IN | WEIGHT: 126 LBS

## 2024-06-06 DIAGNOSIS — R05.8 POST-VIRAL COUGH SYNDROME: Primary | ICD-10-CM

## 2024-06-06 RX ORDER — BENZONATATE 100 MG/1
100 CAPSULE ORAL 3 TIMES DAILY PRN
Qty: 30 CAPSULE | Refills: 0 | Status: SHIPPED | OUTPATIENT
Start: 2024-06-06

## 2024-06-06 NOTE — PROGRESS NOTES
"  Assessment & Plan     Post-viral cough syndrome  Exam unremarkable today with normal vitals, normal lung exam, discussed likely post-viral cough. Symptomatic therapy recommended. Continue hydration, tea with honey, and can also use Tessalon perles prn. Worrisome signs and symptoms were discussed with Capri and she was instructed to return to the clinic for concerning symptoms or to call with questions. Return if symptoms worsen or fail to improve.  - benzonatate (TESSALON) 100 MG capsule; Take 1 capsule (100 mg) by mouth 3 times daily as needed for cough      Subjective   Capri is a 74 year old, presenting for the following health issues:  cold (Cold/cough for 5 weeks now /After babysitting caught a fever and cold and has had cold for 5 weeks)    HPI   Babysat her one year old grandchild - both she and her  got sick  Flu-like symptoms and fever, cough for the first couple of weeks  Those symptoms have improved, but constant cough will not go away  Will take Advil and tea with honey, lemon, and rum which has been very helpful  Doesn't wake her up like she used to  Not sleeping in same room as her  due to cough and not wanting to wake him up - her  also continues to have a cough, but hers is much better than his   Bringing up a small amount of phlegm but she usually just swallows it  No SOB, wheezing, chest pain, nasal congestion, ear pain/pressure  No further fevers, no body aches  No new n/v/d  Cough is worst first thing in the morning or after talking a lot  Still able to go to the gym, lifting weights 3-4 times per week    Nonsmoker   No history of asthma        Objective    /56 (BP Location: Right arm, Patient Position: Right side, Cuff Size: Adult Regular)   Pulse 76   Temp 98  F (36.7  C) (Temporal)   Ht 1.585 m (5' 2.4\")   Wt 57.2 kg (126 lb)   LMP  (LMP Unknown)   SpO2 96%   BMI 22.75 kg/m      Physical Exam   GENERAL: alert and no distress  EYES: Eyes grossly normal to " inspection, PERRL and conjunctivae and sclerae normal  HENT: ear canals and TM's normal, nose and mouth without ulcers or lesions  NECK: no adenopathy, no asymmetry, masses, or scars  RESP: lungs clear to auscultation - no rales, rhonchi or wheezes  CV: regular rate and rhythm, normal S1 S2, no S3 or S4, no murmur, click or rub, no peripheral edema          Signed Electronically by: Olimpia Lambert MD

## 2024-06-13 ENCOUNTER — MYC MEDICAL ADVICE (OUTPATIENT)
Dept: FAMILY MEDICINE | Facility: CLINIC | Age: 74
End: 2024-06-13

## 2024-06-13 DIAGNOSIS — G89.29 CHRONIC PAIN OF LEFT KNEE: Primary | ICD-10-CM

## 2024-06-13 DIAGNOSIS — M25.562 CHRONIC PAIN OF LEFT KNEE: Primary | ICD-10-CM

## 2024-10-11 ENCOUNTER — TELEPHONE (OUTPATIENT)
Dept: DERMATOLOGY | Facility: CLINIC | Age: 74
End: 2024-10-11
Payer: MEDICARE

## 2024-10-11 NOTE — TELEPHONE ENCOUNTER
[facility] Call Center    Phone Message    May a detailed message be left on voicemail: yes     Reason for Call: Other: Pt advised that she rather not have students/residents in room during her 10/25 appt, also added note to the front of chart as well. Wasn't sure if there was a better way to note this?     Action Taken: Message routed to:  [facility] Derm    Travel Screening: Not Applicable     Date of Service:

## 2024-10-11 NOTE — TELEPHONE ENCOUNTER
Detail Level: Detailed Noted in the appt note for 10/25 that pt does not want students/residents.    Detail Level: Generalized Detail Level: Zone

## 2024-10-20 NOTE — PROGRESS NOTES
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 See procedure note below     # onychodystrophy  - PAS done today to rule out onychomycosis  *reviewed CMP from 10/2023  - plan for PO terbinafine pending results    # Blue nevus, left eyebrow, stable    # Multiple benign nevi.   - Monitor for ABCDEs of melanoma   - Continue sun protection - recommend SPF 30 or higher with frequent application   - Return sooner if noticing changing or symptomatic lesions     # Benign lesions - SKs, cherry angiomas, lentigenes.  - No treatment required     # Hx melanoma (unknown path), nasal bridge, s/p MMS 2014. No evidence of clinical recurrence on exam today.  - Records requested from Dr. Kirstin Escobar ([place] again today.   - ABCDs of melanoma were discussed and self skin checks were advised.  - Sun precaution was advised including the use of sun screens of SPF 30 or higher, sun protective clothing, and avoidance of tanning beds.      Procedures Performed:   - Shave biopsy: After discussion of benefits and risks including but not limited to bleeding, infection, scar, incomplete removal, recurrence, and non-diagnostic biopsy, written consent and photographs were obtained. The area was cleaned with isopropyl alcohol. < 1mL of 1% lidocaine with epinephrine was injected to obtain adequate anesthesia. A shave biopsy was performed. Hemostasis was achieved with aluminium chloride. Vaseline and a sterile dressing were applied. The patient tolerated the procedure and no complications were  noted. The patient was provided with verbal and written post care instructions.      Follow-up: 6 month(s) in-person, or earlier for new or changing lesions    Staff and Scribe:     Scribe Disclosure:   I, Amelia Dorado, am serving as a scribe; to document services personally performed by Beronica Mendez MD -based on data collection and the provider's statements to me.     Provider Disclosure:   The documentation recorded by the scribe accurately reflects the services I personally performed and the decisions made by me.    Beronica Mendez MD    Department of Dermatology  [facility]: Phone: [phone], Fax: [phone]  10/25/2024       ____________________________________________    CC: Derm Problem (FBSE- Nose is the area of concern. )    HPI:  Ms. Capri Simpson is a(n) 74 year old female who presents today as a return patient for above.     The patient reports that she currently has no areas of concern. She reports approximately 10 years ago she had melanoma located on her nose. She denies having a fhx of skin cancer. She notes that she is interested in seeing a podiatrist due to toenail fungus that she believes spread from her . She is interested in starting medication as her  used medication and the fungus on his nails cleared up. She notes that every so often she experiences pain and a rash on her armpits, she thinks that this may be due to her deodorant and is unsure if it is involving her lymph nodes.     Patient is otherwise feeling well, without additional skin concerns.    Labs Reviewed:  Reviewed CMP 10/2023    Physical exam:  Vitals: LMP  (LMP Unknown)   GEN: This is a well developed, well-nourished female in no acute distress, in a pleasant mood.    SKIN: Total skin excluding the undergarment areas was performed. The exam included the head/face, neck, both arms, chest, back,  abdomen, both legs, digits and/or nails.   - L plantar foot, 4 mm even medium brown macule  - toe nails dystrophic   - There are dome shaped bright red papules on the trunk and extremities .   - Multiple regular brown pigmented macules and papules are identified on the trunk and extremities. .   - Scattered brown macules on sun exposed areas.  - Waxy stuck on papules and plaques on trunk and extremities.    - No other lesions of concern on areas examined.   LYMPH: No cervical, supraclavicular, axillary, or inguinal lymphadenopathy.        Medications:  Current Outpatient Medications   Medication Sig Dispense Refill    azelaic acid (FINACIA) 15 % external gel Apply topically 2 times daily (Patient not taking: Reported on 10/25/2024) 50 g 2    benzonatate (TESSALON) 100 MG capsule Take 1 capsule (100 mg) by mouth 3 times daily as needed for cough (Patient not taking: Reported on 10/25/2024) 30 capsule 0    calcium carbonate (OS-JONNY) 1500 (600 Ca) MG tablet Take 600 mg by mouth daily (Patient not taking: Reported on 10/25/2024)      pimecrolimus (ELIDEL) 1 % external cream  (Patient not taking: Reported on 10/25/2024)      vitamin D3 (CHOLECALCIFEROL) 50 mcg (2000 units) tablet Take 1 tablet by mouth daily (Patient not taking: Reported on 10/25/2024)       No current facility-administered medications for this visit.      Past Medical History:   Patient Active Problem List   Diagnosis    History of melanoma    Age-related osteoporosis without current pathological fracture     Past Medical History:   Diagnosis Date    Melanoma of skin (H)     On tip of nose, removed by dermatologist. Dr Escobar    Osteoporosis     2019 and 2023        CC Olimpia Lambert MD  [address] [place] [address] on close of this encounter.

## 2024-10-25 ENCOUNTER — OFFICE VISIT (OUTPATIENT)
Dept: DERMATOLOGY | Facility: CLINIC | Age: 74
End: 2024-10-25
Attending: STUDENT IN AN ORGANIZED HEALTH CARE EDUCATION/TRAINING PROGRAM
Payer: MEDICARE

## 2024-10-25 DIAGNOSIS — D18.01 CHERRY ANGIOMA: ICD-10-CM

## 2024-10-25 DIAGNOSIS — L60.3 ONYCHODYSTROPHY: ICD-10-CM

## 2024-10-25 DIAGNOSIS — L81.4 SOLAR LENTIGO: ICD-10-CM

## 2024-10-25 DIAGNOSIS — D22.9 MULTIPLE BENIGN NEVI: ICD-10-CM

## 2024-10-25 DIAGNOSIS — Z85.820 HISTORY OF MELANOMA: Primary | ICD-10-CM

## 2024-10-25 DIAGNOSIS — Z12.83 SKIN CANCER SCREENING: ICD-10-CM

## 2024-10-25 DIAGNOSIS — D49.2 NEOPLASM OF UNSPECIFIED BEHAVIOR OF BONE, SOFT TISSUE, AND SKIN: ICD-10-CM

## 2024-10-25 DIAGNOSIS — L82.1 SEBORRHEIC KERATOSES: ICD-10-CM

## 2024-10-25 PROCEDURE — 88304 TISSUE EXAM BY PATHOLOGIST: CPT | Mod: 26 | Performed by: DERMATOLOGY

## 2024-10-25 PROCEDURE — 11102 TANGNTL BX SKIN SINGLE LES: CPT | Performed by: DERMATOLOGY

## 2024-10-25 PROCEDURE — 99214 OFFICE O/P EST MOD 30 MIN: CPT | Mod: 25 | Performed by: DERMATOLOGY

## 2024-10-25 PROCEDURE — 88312 SPECIAL STAINS GROUP 1: CPT | Mod: 26 | Performed by: DERMATOLOGY

## 2024-10-25 PROCEDURE — 88304 TISSUE EXAM BY PATHOLOGIST: CPT | Mod: TC | Performed by: DERMATOLOGY

## 2024-10-25 PROCEDURE — 88305 TISSUE EXAM BY PATHOLOGIST: CPT | Mod: 26 | Performed by: DERMATOLOGY

## 2024-10-25 ASSESSMENT — PAIN SCALES - GENERAL: PAINLEVEL_OUTOF10: NO PAIN (0)

## 2024-10-25 NOTE — NURSING NOTE
Dermatology Rooming Note    Capri Simpson's goals for this visit include:   Chief Complaint   Patient presents with    Derm Problem     FBSE- Nose is the area of concern.      Vito Sultana, EMT  Clinic Support  [facility]     [phone]    Employed by [facility] [employer]

## 2024-10-25 NOTE — NURSING NOTE
Lidocaine-epinephrine 1-1:945400 % injection   0.25mL once for one use, starting 10/25/2024 ending 10/25/2024,  2mL disp, R-0, injection  Injected by Pippa Martinez RN

## 2024-10-25 NOTE — LETTER
10/25/2024       RE: Capri Simpson  [address]  [facility] [address]     Dear Colleague,    Thank you for referring your patient, Capri Simpson, to the [facility] [place] at [facility]. Please see a copy of my visit note below.    [facility] Dermatology Note  Encounter Date: Oct 25, 2024  Office Visit     Dermatology Problem List:  Last 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 See procedure note below     # onychodystrophy  - PAS done today to rule out onychomycosis  *reviewed CMP from 10/2023  - plan for PO terbinafine pending results    # Blue nevus, left eyebrow, stable    # Multiple benign nevi.   - Monitor for ABCDEs of melanoma   - Continue sun protection - recommend SPF 30 or higher with frequent application   - Return sooner if noticing changing or symptomatic lesions     # Benign lesions - SKs, cherry angiomas, lentigenes.  - No treatment required     # Hx melanoma (unknown path), nasal bridge, s/p MMS 2014. No evidence of clinical recurrence on exam today.  - Records requested from Dr. Kirstin Escobar ([place] again today.   - ABCDs of melanoma were discussed and self skin checks were advised.  - Sun precaution was advised including the use of sun screens of SPF 30 or higher, sun protective clothing, and avoidance of tanning beds.      Procedures Performed:   - Shave biopsy: After discussion of benefits and risks including but not limited to bleeding, infection, scar, incomplete removal, recurrence, and non-diagnostic biopsy, written consent and  photographs were obtained. The area was cleaned with isopropyl alcohol. < 1mL of 1% lidocaine with epinephrine was injected to obtain adequate anesthesia. A shave biopsy was performed. Hemostasis was achieved with aluminium chloride. Vaseline and a sterile dressing were applied. The patient tolerated the procedure and no complications were noted. The patient was provided with verbal and written post care instructions.      Follow-up: 6 month(s) in-person, or earlier for new or changing lesions    Staff and Scribe:     Scribe Disclosure:   I, Amelia Dorado, am serving as a scribe; to document services personally performed by Beronica Mendez MD -based on data collection and the provider's statements to me.     Provider Disclosure:   The documentation recorded by the scribe accurately reflects the services I personally performed and the decisions made by me.    Beronica Mendez MD    Department of Dermatology  [facility]: Phone: [phone], Fax: [phone]  10/25/2024       ____________________________________________    CC: Derm Problem (FBSE- Nose is the area of concern. )    HPI:  Ms. Capri Simpson is a(n) 74 year old female who presents today as a return patient for above.     The patient reports that she currently has no areas of concern. She reports approximately 10 years ago she had melanoma located on her nose. She denies having a fhx of skin cancer. She notes that she is interested in seeing a podiatrist due to toenail fungus that she believes spread from her . She is interested in starting medication as her  used medication and the fungus on his nails cleared up. She notes that every so often she experiences pain and a rash on her armpits, she thinks that this may be due to her deodorant and is unsure if it is involving her lymph nodes.     Patient is otherwise feeling well, without  additional skin concerns.    Labs Reviewed:  Reviewed CMP 10/2023    Physical exam:  Vitals: LMP  (LMP Unknown)   GEN: This is a well developed, well-nourished female in no acute distress, in a pleasant mood.    SKIN: Total skin excluding the undergarment areas was performed. The exam included the head/face, neck, both arms, chest, back, abdomen, both legs, digits and/or nails.   - L plantar foot, 4 mm even medium brown macule  - toe nails dystrophic   - There are dome shaped bright red papules on the trunk and extremities .   - Multiple regular brown pigmented macules and papules are identified on the trunk and extremities. .   - Scattered brown macules on sun exposed areas.  - Waxy stuck on papules and plaques on trunk and extremities.    - No other lesions of concern on areas examined.   LYMPH: No cervical, supraclavicular, axillary, or inguinal lymphadenopathy.        Medications:  Current Outpatient Medications   Medication Sig Dispense Refill     azelaic acid (FINACIA) 15 % external gel Apply topically 2 times daily (Patient not taking: Reported on 10/25/2024) 50 g 2     benzonatate (TESSALON) 100 MG capsule Take 1 capsule (100 mg) by mouth 3 times daily as needed for cough (Patient not taking: Reported on 10/25/2024) 30 capsule 0     calcium carbonate (OS-JONNY) 1500 (600 Ca) MG tablet Take 600 mg by mouth daily (Patient not taking: Reported on 10/25/2024)       pimecrolimus (ELIDEL) 1 % external cream  (Patient not taking: Reported on 10/25/2024)       vitamin D3 (CHOLECALCIFEROL) 50 mcg (2000 units) tablet Take 1 tablet by mouth daily (Patient not taking: Reported on 10/25/2024)       No current facility-administered medications for this visit.      Past Medical History:   Patient Active Problem List   Diagnosis     History of melanoma     Age-related osteoporosis without current pathological fracture     Past Medical History:   Diagnosis Date     Melanoma of skin (H)     On tip of nose, removed by  dermatologist. Dr Escobar     Osteoporosis     2019 and 2023        CC Olimpia Lambert MD  [address] [place] [address] on close of this encounter.      Again, thank you for allowing me to participate in the care of your patient.      Sincerely,    Beronica Mendez MD

## 2024-10-25 NOTE — PATIENT INSTRUCTIONS
Checking for Skin Cancer  You can help find cancer early by checking your skin each month. There are 3 main kinds of skin cancer: melanoma, basal cell carcinoma, and squamous cell carcinoma. Doing monthly skin checks is the best way to find new marks, sores, or skin changes. Follow these instructions for checking your skin.   The ABCDEs of checking moles for melanoma   Check your moles or growths for signs of melanoma using ABCDE:   Asymmetry: The sides of the mole or growth don t match.  Border: The edges are ragged, notched, or blurred.  Color: The color within the mole or growth varies. It could be black, brown, tan, white, or shades of red, gray, or blue.  Diameter: The mole or growth is larger than   inch or 6 mm (size of a pencil eraser).  Evolving: The size, shape, texture, or color of the mole or growth is changing.     ABCDE's of moles on light skin.        ABCDE's of moles on dark skin may be harder to identify.     Checking for other types of skin cancer  Basal cell carcinoma or squamous cell carcinoma cause symptoms like:     A spot or mole that looks different from all other marks on your skin  Changes in how an area feels, such as itching, tenderness, or pain  Changes in the skin's surface, such as oozing, bleeding, or scaliness  A sore that doesn't heal  New swelling, redness, or spread of color beyond the border of a mole    Who s at risk?  Anyone of any skin color can get skin cancer. But you're at greater risk if you have:   Fair skin that freckles easily and burns instead of tanning  Light-colored or red hair  Light-colored eyes  Many moles or abnormal moles on your skin  A long history of unprotected exposure to sunlight or tanning beds  A history of many blistering sunburns as a child or teen  A family history of skin cancer  Been exposed to radiation or chemicals  A weakened immune system  Been exposed to arsenic  If you've had skin cancer in the past, you're at high risk of having it again.    How to check your skin  Do your monthly skin checkups in front of a full-length mirror. Use a room with good lighting so it's easier to see. Use a hand mirror to look at hard-to-see places like your buttocks and back. You can also have a trusted friend or family member help you with these checks. Check every part of your body, including your:   Head (ears, face, neck, and scalp)  Torso (front, back, sides, and under breasts)  Arms (tops, undersides, and armpits)  Hands (palms, backs, and fingers, including under the nails)  Lower back, buttocks, and genitals  Legs (front, back, and sides)  Feet (tops, soles, toes, including under the nails, and between toes)  Watch for new spots on your skin or a spot that's changing in color, shape, size.   If you have a lot of moles, take digital photos of them each month. Make sure to take photos both up close and from a distance. These can help you see if any moles change over time.   Know your skin  Most skin changes aren't cancer. But if you see any changes in your skin, call your healthcare provider right away. Only they can tell you if a change is a problem. If you have skin cancer, seeing your provider can be the first step to getting the treatment that could save your life.   Jacques last reviewed this educational content on 10/1/2021    2137-5989 [employer]. All rights reserved. This information is not intended as a substitute for professional medical care. Always follow your healthcare professional's instructions.     Wound Care After a Biopsy    What is a skin biopsy?  A skin biopsy allows the doctor to examine a very small piece of tissue under the microscope to determine the diagnosis and the best treatment for the skin condition. A local anesthetic (numbing medicine) is injected with a very small needle into the skin area to be tested. A small piece of skin is taken from the area. Sometimes a suture (stitch) is used.     What are the risks of a skin  biopsy?  I will experience scar, bleeding, swelling, pain, crusting and redness. I may experience incomplete removal or recurrence. Risks of this procedure are excessive bleeding, bruising, infection, nerve damage, numbness, thick (hypertrophic or keloidal) scar and non-diagnostic biopsy.    How should I care for my wound for the first 24 hours?  Keep the wound dry and covered for 24 hours  If it bleeds, hold direct pressure on the area for 15 minutes. If bleeding does not stop, call us or go to the emergency room  Avoid strenuous exercise the first 1-2 days or as your doctor instructs you    How should I care for the wound after 24 hours?  After 24 hours, remove the bandage  You may bathe or shower as normal  If you had a scalp biopsy, you can shampoo as usual and can use shower water to clean the biopsy site daily  Clean the wound once a day with gentle soap and water  Do not scrub, be gentle  Apply white petroleum/Vaseline after cleaning the wound with a cotton swab or a clean finger, and keep the site covered with a Bandaid /bandage. Bandages are not necessary with a scalp biopsy  If you are unable to cover the site with a Bandaid /bandage, re-apply ointment 2-3 times a day to keep the site moist. Moisture will help with healing  Avoid strenuous activity for first 1-2 days  Avoid lakes, rivers, pools, and oceans until the stitches are removed or the site is healed    How do I clean my wound?  Wash hands thoroughly with soap or use hand  before all wound care  Clean the wound with gentle soap and water  Apply white petroleum/Vaseline  to wound after it is clean  Replace the Bandaid /bandage to keep the wound covered for the first few days or as instructed by your doctor  If you had a scalp biopsy, warm shower water to the area on a daily basis should suffice    What should I use to clean my wound?   Cotton-tipped applicators (Qtips )  White petroleum jelly (Vaseline ). Use a clean new container and use  Q-tips to apply.  Bandaids  as needed  Gentle soap     How should I care for my wound long term?  Do not get your wound dirty  Keep up with wound care for one week or until the area is healed.  A small scab will form and fall off by itself when the area is completely healed. The area will be red and will become pink in color as it heals. Sun protection is very important for how your scar will turn out. Sunscreen with an SPF 30 or greater is recommended once the area is healed.  You should have some soreness but it should be mild and slowly go away over several days. Talk to your doctor about using tylenol for pain,    When should I call my doctor?  If you have increased:   Pain or swelling  Pus or drainage (clear or slightly yellow drainage is ok)  Temperature over 100F  Spreading redness or warmth around wound    When will I hear about my results?  The biopsy results can take 2 weeks to come back.  Your results will automatically release to [employer] before your provider has even reviewed them.  The clinic will call you with the results, send you a [employer] message, or have you schedule a follow-up clinic or phone time to discuss the results.  Contact our clinics if you do not hear from us in 2 weeks.    Who should I call with questions?  [facility]: [phone]  [facility]: [phone]  For urgent needs outside of business hours call the [facility] at [phone] and ask for the dermatology resident on call

## 2024-10-25 NOTE — Clinical Note
Can we request records from Dr. Kirstin Escobar ([place]) ? Per chart an MILKA was signed I hope it's still tehre...

## 2024-10-28 DIAGNOSIS — B35.1 ONYCHOMYCOSIS: Primary | ICD-10-CM

## 2024-10-28 LAB
PATH REPORT.COMMENTS IMP SPEC: NORMAL
PATH REPORT.COMMENTS IMP SPEC: NORMAL
PATH REPORT.FINAL DX SPEC: NORMAL
PATH REPORT.GROSS SPEC: NORMAL
PATH REPORT.MICROSCOPIC SPEC OTHER STN: NORMAL
PATH REPORT.RELEVANT HX SPEC: NORMAL

## 2024-10-28 RX ORDER — CICLOPIROX 80 MG/ML
SOLUTION TOPICAL
Qty: 6.6 ML | Refills: 11 | Status: SHIPPED | OUTPATIENT
Start: 2024-10-28

## 2024-10-28 RX ORDER — TERBINAFINE HYDROCHLORIDE 250 MG/1
250 TABLET ORAL DAILY
Qty: 90 TABLET | Refills: 0 | Status: SHIPPED | OUTPATIENT
Start: 2024-10-28 | End: 2025-01-26

## 2025-04-23 ENCOUNTER — ANCILLARY PROCEDURE (OUTPATIENT)
Dept: BONE DENSITY | Facility: CLINIC | Age: 75
End: 2025-04-23
Attending: FAMILY MEDICINE
Payer: COMMERCIAL

## 2025-04-23 DIAGNOSIS — M81.0 AGE-RELATED OSTEOPOROSIS WITHOUT CURRENT PATHOLOGICAL FRACTURE: ICD-10-CM

## 2025-04-23 PROCEDURE — 77080 DXA BONE DENSITY AXIAL: CPT | Mod: TC | Performed by: PHYSICIAN ASSISTANT

## 2025-05-01 ENCOUNTER — OFFICE VISIT (OUTPATIENT)
Dept: FAMILY MEDICINE | Facility: CLINIC | Age: 75
End: 2025-05-01
Payer: MEDICARE

## 2025-05-01 VITALS
HEIGHT: 61 IN | HEART RATE: 72 BPM | SYSTOLIC BLOOD PRESSURE: 122 MMHG | BODY MASS INDEX: 24.17 KG/M2 | TEMPERATURE: 97 F | DIASTOLIC BLOOD PRESSURE: 78 MMHG | WEIGHT: 128 LBS | OXYGEN SATURATION: 97 %

## 2025-05-01 DIAGNOSIS — Z13.220 SCREENING FOR CHOLESTEROL LEVEL: ICD-10-CM

## 2025-05-01 DIAGNOSIS — R05.1 ACUTE COUGH: ICD-10-CM

## 2025-05-01 DIAGNOSIS — Z85.820 HISTORY OF MELANOMA: ICD-10-CM

## 2025-05-01 DIAGNOSIS — M81.0 AGE-RELATED OSTEOPOROSIS WITHOUT CURRENT PATHOLOGICAL FRACTURE: ICD-10-CM

## 2025-05-01 DIAGNOSIS — Z12.31 ENCOUNTER FOR SCREENING MAMMOGRAM FOR MALIGNANT NEOPLASM OF BREAST: ICD-10-CM

## 2025-05-01 DIAGNOSIS — L03.031 PARONYCHIA OF TOE, RIGHT: ICD-10-CM

## 2025-05-01 DIAGNOSIS — Z00.00 ROUTINE GENERAL MEDICAL EXAMINATION AT A HEALTH CARE FACILITY: Primary | ICD-10-CM

## 2025-05-01 PROCEDURE — 80061 LIPID PANEL: CPT | Mod: ORL | Performed by: FAMILY MEDICINE

## 2025-05-01 PROCEDURE — 82306 VITAMIN D 25 HYDROXY: CPT | Mod: ORL | Performed by: FAMILY MEDICINE

## 2025-05-01 PROCEDURE — 80048 BASIC METABOLIC PNL TOTAL CA: CPT | Mod: ORL | Performed by: FAMILY MEDICINE

## 2025-05-01 RX ORDER — MUPIROCIN 20 MG/G
OINTMENT TOPICAL 3 TIMES DAILY
Qty: 22 G | Refills: 0 | Status: SHIPPED | OUTPATIENT
Start: 2025-05-01 | End: 2025-05-08

## 2025-05-01 SDOH — HEALTH STABILITY: PHYSICAL HEALTH: ON AVERAGE, HOW MANY DAYS PER WEEK DO YOU ENGAGE IN MODERATE TO STRENUOUS EXERCISE (LIKE A BRISK WALK)?: 4 DAYS

## 2025-05-01 SDOH — HEALTH STABILITY: PHYSICAL HEALTH: ON AVERAGE, HOW MANY MINUTES DO YOU ENGAGE IN EXERCISE AT THIS LEVEL?: 60 MIN

## 2025-05-01 ASSESSMENT — SOCIAL DETERMINANTS OF HEALTH (SDOH): HOW OFTEN DO YOU GET TOGETHER WITH FRIENDS OR RELATIVES?: THREE TIMES A WEEK

## 2025-05-01 NOTE — PATIENT INSTRUCTIONS
Fosamax (oral)  Reclast (IV)    Either way, we should repeat a bone density in 2 years    Plan on colonoscopy next year    Mammogram ordered    CXR ordered  Imaging scheduling  [facility] [phone] [facility] [place]      Patient Education   Preventive Care Advice   This is general advice given by our system to help you stay healthy. However, your care team may have specific advice just for you. Please talk to your care team about your preventive care needs.  Nutrition  Eat 5 or more servings of fruits and vegetables each day.  Try wheat bread, brown rice and whole grain pasta (instead of white bread, rice, and pasta).  Get enough calcium and vitamin D. Check the label on foods and aim for 100% of the RDA (recommended daily allowance).  Lifestyle  Exercise at least 150 minutes each week  (30 minutes a day, 5 days a week).  Do muscle strengthening activities 2 days a week. These help control your weight and prevent disease.  No smoking.  Wear sunscreen to prevent skin cancer.  Have a dental exam and cleaning every 6 months.  Yearly exams  See your health care team every year to talk about:  Any changes in your health.  Any medicines your care team has prescribed.  Preventive care, family planning, and ways to prevent chronic diseases.  Shots (vaccines)   HPV shots (up to age 26), if you've never had them before.  Hepatitis B shots (up to age 59), if you've never had them before.  COVID-19 shot: Get this shot when it's due.  Flu shot: Get a flu shot every year.  Tetanus shot: Get a tetanus shot every 10 years.  Pneumococcal, hepatitis A, and RSV shots: Ask your care team if you need these based on your risk.  Shingles shot (for age 50 and up)  General health tests  Diabetes screening:  Starting at age 35, Get screened for diabetes at least every 3 years.  If you are younger than age 35, ask your care team if you should be screened for diabetes.  Cholesterol test: At age 39, start having a  cholesterol test every 5 years, or more often if advised.  Bone density scan (DEXA): At age 50, ask your care team if you should have this scan for osteoporosis (brittle bones).  Hepatitis C: Get tested at least once in your life.  STIs (sexually transmitted infections)  Before age 24: Ask your care team if you should be screened for STIs.  After age 24: Get screened for STIs if you're at risk. You are at risk for STIs (including HIV) if:  You are sexually active with more than one person.  You don't use condoms every time.  You or a partner was diagnosed with a sexually transmitted infection.  If you are at risk for HIV, ask about PrEP medicine to prevent HIV.  Get tested for HIV at least once in your life, whether you are at risk for HIV or not.  Cancer screening tests  Cervical cancer screening: If you have a cervix, begin getting regular cervical cancer screening tests starting at age 21.  Breast cancer scan (mammogram): If you've ever had breasts, begin having regular mammograms starting at age 40. This is a scan to check for breast cancer.  Colon cancer screening: It is important to start screening for colon cancer at age 45.  Have a colonoscopy test every 10 years (or more often if you're at risk) Or, ask your provider about stool tests like a FIT test every year or Cologuard test every 3 years.  To learn more about your testing options, visit:   .  For help making a decision, visit:   https://bit.ly/pn70385.  Prostate cancer screening test: If you have a prostate, ask your care team if a prostate cancer screening test (PSA) at age 55 is right for you.  Lung cancer screening: If you are a current or former smoker ages 50 to 80, ask your care team if ongoing lung cancer screenings are right for you.  For informational purposes only. Not to replace the advice of your health care provider. Copyright   2023 [employer]. All rights reserved. Clinically reviewed by the [facility] Transitions  Program. [employer] 833558 - REV 01/24.  Hearing Loss: Care Instructions  Overview     Hearing loss is a sudden or slow decrease in how well you hear. It can range from slight to profound. Permanent hearing loss can occur with aging. It also can happen when you are exposed long-term to loud noise. Examples include listening to loud music, riding motorcycles, or being around other loud machines.  Hearing loss can affect your work and home life. It can make you feel lonely or depressed. You may feel that you have lost your independence. But hearing aids and other devices can help you hear better and feel connected to others.  Follow-up care is a key part of your treatment and safety. Be sure to make and go to all appointments, and call your doctor if you are having problems. It's also a good idea to know your test results and keep a list of the medicines you take.  How can you care for yourself at home?  Avoid loud noises whenever possible. This helps keep your hearing from getting worse.  Always wear hearing protection around loud noises.  Wear a hearing aid as directed.  A professional can help you pick a hearing aid that will work best for you.  You can also get hearing aids over the counter for mild to moderate hearing loss.  Have hearing tests as your doctor suggests. They can show whether your hearing has changed. Your hearing aid may need to be adjusted.  Use other devices as needed. These may include:  Telephone amplifiers and hearing aids that can connect to a television, stereo, radio, or microphone.  Devices that use lights or vibrations. These alert you to the doorbell, a ringing telephone, or a baby monitor.  Television closed-captioning. This shows the words at the bottom of the screen. Most new TVs can do this.  TTY (text telephone). This lets you type messages back and forth on the telephone instead of talking or listening. These devices are also called TDD. When messages are typed on the keyboard,  "they are sent over the phone line to a receiving TTY. The message is shown on a monitor.  Use text messaging, social media, and email if it is hard for you to communicate by telephone.  Try to learn a listening technique called speechreading. It is not lipreading. You pay attention to people's gestures, expressions, posture, and tone of voice. These clues can help you understand what a person is saying. Face the person you are talking to, and have them face you. Make sure the lighting is good. You need to see the other person's face clearly.  Think about counseling if you need help to adjust to your hearing loss.  When should you call for help?  Watch closely for changes in your health, and be sure to contact your doctor if:    You think your hearing is getting worse.     You have new symptoms, such as dizziness or nausea.   Where can you learn more?  Go to https://www.[employer]/patiented  Enter R798 in the search box to learn more about \"Hearing Loss: Care Instructions.\"  Current as of: October 27, 2024  Content Version: 14.4    5204-1059 [employer].   Care instructions adapted under license by your healthcare professional. If you have questions about a medical condition or this instruction, always ask your healthcare professional. [employer] disclaims any warranty or liability for your use of this information.    Learning About Sleeping Well  What does sleeping well mean?     Sleeping well means getting enough sleep to feel good and stay healthy. How much sleep is enough varies among people.  The number of hours you sleep and how you feel when you wake up are both important. If you do not feel refreshed, you probably need more sleep. Another sign of not getting enough sleep is feeling tired during the day.  Experts recommend that adults get at least 7 or more hours of sleep per day. Children and older adults need more sleep.  Why is getting enough sleep important?  Getting enough " "quality sleep is a basic part of good health. When your sleep suffers, your physical health, mood, and your thoughts can suffer too. You may find yourself feeling more grumpy or stressed. Not getting enough sleep also can lead to serious problems, including injury, accidents, anxiety, and depression.  What might cause poor sleeping?  Many things can cause sleep problems, including:  Changes to your sleep schedule.  Stress. Stress can be caused by fear about a single event, such as giving a speech. Or you may have ongoing stress, such as worry about work or school.  Depression, anxiety, and other mental or emotional conditions.  Changes in your sleep habits or surroundings. This includes changes that happen where you sleep, such as noise, light, or sleeping in a different bed. It also includes changes in your sleep pattern, such as having jet lag or working a late shift.  Health problems, such as pain, breathing problems, and restless legs syndrome.  Lack of regular exercise.  Using alcohol, nicotine, or caffeine before bed.  How can you help yourself?  Here are some tips that may help you sleep more soundly and wake up feeling more refreshed.  Your sleeping area   Use your bedroom only for sleeping and sex. A bit of light reading may help you fall asleep. But if it doesn't, do your reading elsewhere in the house. Try not to use your TV, computer, smartphone, or tablet while you are in bed.  Be sure your bed is big enough to stretch out comfortably, especially if you have a sleep partner.  Keep your bedroom quiet, dark, and cool. Use curtains, blinds, or a sleep mask to block out light. To block out noise, use earplugs, soothing music, or a \"white noise\" machine.  Your evening and bedtime routine   Create a relaxing bedtime routine. You might want to take a warm shower or bath, or listen to soothing music.  Go to bed at the same time every night. And get up at the same time every morning, even if you feel " "tired.  What to avoid   Limit caffeine (coffee, tea, caffeinated sodas) during the day, and don't have any for at least 6 hours before bedtime.  Avoid drinking alcohol before bedtime. Alcohol can cause you to wake up more often during the night.  Try not to smoke or use tobacco, especially in the evening. Nicotine can keep you awake.  Limit naps during the day, especially close to bedtime.  Avoid lying in bed awake for too long. If you can't fall asleep or if you wake up in the middle of the night and can't get back to sleep within about 20 minutes, get out of bed and go to another room until you feel sleepy.  Avoid taking medicine right before bed that may keep you awake or make you feel hyper or energized. Your doctor can tell you if your medicine may do this and if you can take it earlier in the day.  If you can't sleep   Imagine yourself in a peaceful, pleasant scene. Focus on the details and feelings of being in a place that is relaxing.  Get up and do a quiet or boring activity until you feel sleepy.  Avoid drinking any liquids before going to bed to help prevent waking up often to use the bathroom.  Where can you learn more?  Go to https://www.[employer].net/patiented  Enter J942 in the search box to learn more about \"Learning About Sleeping Well.\"  Current as of: July 31, 2024  Content Version: 14.4    9415-5452 [employer].   Care instructions adapted under license by your healthcare professional. If you have questions about a medical condition or this instruction, always ask your healthcare professional. [employer] disclaims any warranty or liability for your use of this information.       "

## 2025-05-01 NOTE — PROGRESS NOTES
Preventive Care Visit  [facility]  Olimpia Lambert MD, Family Medicine  May 1, 2025      Assessment & Plan     Routine general medical examination at a health care facility  FH of colon cancer - has been 3-4 years since last colonoscopy and was recommended to have follow-up in 5 years. Will plan to re-address next year.     History of melanoma  Continue follow-up with dermatology.     Age-related osteoporosis without current pathological fracture  Prefers not to start Rx medication at this time, although she has met with Claudia Oropeza [facility] to discuss options. Her older sister is doing well on IV Reclast. I discussed benefit of fracture risk reduction with treatment and that I would strongly recommend. She will think about it. Either way plan to repeat DEXA in 2 yrs, continue wt bearing exercise, continue calcium and vitamin D.  - Basic metabolic panel  - Vitamin D deficiency screening    Encounter for screening mammogram for malignant neoplasm of breast  Mammogram ordered. She asks about US or MRI -- discussed not usually covered unless indicated by family or personal history or mammogram findings.   - MA Screening Bilateral w/ Brett; Future    Screening for cholesterol level  Would like to continue with lipid screening.   - Lipid panel reflex to direct LDL Fasting; Future  - Lipid panel reflex to direct LDL Fasting    Paronychia of toe, right  Findings c/w paronychia. Treat with warm soaks, grow nail out a bit, and mupirocin TID for one week.   - mupirocin (BACTROBAN) 2 % external ointment; Apply topically 3 times daily for 7 days.    Acute cough  2 weeks of cough -- due to lung exam findings recommend CXR to r/o atypical pneumonia.   - XR Chest 2 Views; Future    Counseling  Appropriate preventive services were addressed with this patient via screening, questionnaire, or discussion as appropriate for fall prevention, nutrition, physical activity, Tobacco-use cessation, social engagement, weight loss and  cognition.  Checklist reviewing preventive services available has been given to the patient.  Reviewed patient's diet, addressing concerns and/or questions.   Discussed possible causes of fatigue. The patient was provided with written information regarding signs of hearing loss.       Follow-up  Return in about 1 year (around 5/1/2026) for physical.    Seymour Farooq is a 75 year old, presenting for the following:  Wellness Visit (Pt reports she has been coughing a lot for the last two weeks and she is unsure why. )            HPI  Cough -    - Two weeks ago started  - But not as bad as it was 2 weeks ago  - Some wheezing  - Not sure if it's allergies but has never had a history of allergies  - No nasal drainage  - A few days ago woke up with a headache, focused on hydration but it didn't go away so she took some Advil   - No treatment tried for the cough (other than the Advil yesterday for headache)  - No ear pain/pressure  - No recent sick contacts    Osteoporosis -   - Has been going to strength classes at [employer] and has a   - Lots of walking 2-3 miles/day  - Most recent DEXA 4/23/2025 with most negative T score -3.6 at lumbar spine  - Taking calcium and vitamin D  - Her sister is on Reclast    Hearing concerns  - Not sure if her  is mumbling or if she's not hearing well  - No drainage from the ears    History of melanoma  - Nasal bridge, s/p MMS 2014   - Followed by dermatology, next visit on 6/25/2025    R great toe discomfort  - Some swelling and pain at the lateral nailbed  - Feels like she has a rubber band around her toe  - Tried some neosporin which did help     Advance Care Planning    Patient states has Health Care Directive and will send to [employer].        5/1/2025   General Health   How would you rate your overall physical health? Excellent   Feel stress (tense, anxious, or unable to sleep) Not at all         5/1/2025   Nutrition   Diet: Vegetarian/vegan          5/1/2025   Exercise   Days per week of moderate/strenous exercise 4 days   Average minutes spent exercising at this level 60 min         5/1/2025   Social Factors   Frequency of gathering with friends or relatives Three times a week   Worry food won't last until get money to buy more No   Food not last or not have enough money for food? No   Do you have housing? (Housing is defined as stable permanent housing and does not include staying outside in a car, in a tent, in an abandoned building, in an overnight shelter, or couch-surfing.) Yes   Are you worried about losing your housing? No   Lack of transportation? No   Unable to get utilities (heat,electricity)? No         5/1/2025   Fall Risk   Fallen 2 or more times in the past year? No   Trouble with walking or balance? No          5/1/2025   Activities of Daily Living- Home Safety   Needs help with the following daily activites None of the above   Safety concerns in the home None of the above         5/1/2025   Dental   Dentist two times every year? Yes         5/1/2025   Hearing Screening   Hearing concerns? (!) I FEEL THAT PEOPLE ARE MUMBLING OR NOT SPEAKING CLEARLY.         5/1/2025   Driving Risk Screening   Patient/family members have concerns about driving No         5/1/2025   General Alertness/Fatigue Screening   Have you been more tired than usual lately? (!) YES         5/1/2025   Urinary Incontinence Screening   Bothered by leaking urine in past 6 months No       Today's PHQ-2 Score:       5/1/2025     9:33 AM   PHQ-2 ( 1999 [employer])   Q1: Little interest or pleasure in doing things 0   Q2: Feeling down, depressed or hopeless 0   PHQ-2 Score 0    Q1: Little interest or pleasure in doing things Not at all   Q2: Feeling down, depressed or hopeless Not at all   PHQ-2 Score 0       Patient-reported           5/1/2025   Substance Use   Alcohol more than 3/day or more than 7/wk Not Applicable   Do you have a current opioid prescription? No   How severe/bad is  pain from 1 to 10? 0/10 (No Pain)   Do you use any other substances recreationally? No     Social History     Tobacco Use    Smoking status: Never    Smokeless tobacco: Never   Vaping Use    Vaping status: Never Used   Substance Use Topics    Alcohol use: Not Currently    Drug use: Never          Mammogram Screening - After age 74- determine frequency with patient based on health status, life expectancy and patient goals - Used to get mammogram and US due to dense breast tissue    ASCVD Risk   The 10-year ASCVD risk score (Liam CURRY, et al., 2019) is: 18.4%    Values used to calculate the score:      Age: 75 years      Sex: Female      Is Non- : No      Diabetic: No      Tobacco smoker: No      Systolic Blood Pressure: 122 mmHg      Is BP treated: No      HDL Cholesterol: 67 mg/dL      Total Cholesterol: 195 mg/dL          Reviewed and updated as needed this visit by Provider      Problems  Med Hx  Surg Hx  Fam Hx            Current providers sharing in care for this patient include:  Patient Care Team:  Olimpia Lambert MD as PCP - General (Family Medicine)  Dallas Guerrero MD as Resident (Student in organized health care education/training program)  Lorena Gomes MD as MD (Dermatology)  Claudia Oropeza [facility] as Pharmacist (Pharmacist)  Olimpia Lambert MD as Assigned PCP  Beronica Mendez MD as Assigned Dermatology Provider    The following health maintenance items are reviewed in Epic and correct as of today:  Health Maintenance   Topic Date Due    HEPATITIS C SCREENING  Never done    DTAP/TDAP/TD IMMUNIZATION (1 - Tdap) Never done    ZOSTER IMMUNIZATION (1 of 2) Never done    RSV VACCINE (1 - 1-dose 75+ series) Never done    COVID-19 Vaccine (3 - 2024-25 season) 09/01/2024    MEDICARE ANNUAL WELLNESS VISIT  05/01/2026    FALL RISK ASSESSMENT  05/01/2026    DIABETES SCREENING  10/11/2026    DEXA  04/23/2027    LIPID  02/16/2028    ADVANCE CARE PLANNING  02/16/2028     "PHQ-2 (once per calendar year)  Completed    INFLUENZA VACCINE  Completed    Pneumococcal Vaccine: 50+ Years  Completed    HPV IMMUNIZATION  Aged Out    MENINGITIS IMMUNIZATION  Aged Out          Objective    Exam  /78   Pulse 72   Temp 97  F (36.1  C)   Ht 1.552 m (5' 1.1\")   Wt 58.1 kg (128 lb)   LMP  (LMP Unknown)   SpO2 97%   BMI 24.10 kg/m       Physical Exam  GENERAL: alert and no distress  EYES: Eyes grossly normal to inspection, PERRL and conjunctivae and sclerae normal  HENT: ear canals and TM's normal, nose and mouth without ulcers or lesions  NECK: no adenopathy, no asymmetry, masses, or scars  RESP: normal work of breathing, crackles in right lower lung field otherwise lungs clear to auscultation   BREAST: normal without masses, tenderness or nipple discharge and no palpable axillary masses or adenopathy  CV: regular rate and rhythm, normal S1 S2, no S3 or S4, no murmur, click or rub, no peripheral edema  ABDOMEN: soft, nontender, without hepatosplenomegaly or masses  MS: no gross musculoskeletal defects noted, no edema  SKIN: lateral aspect of right great toe with mild erythema and edema, ingrown toenail, onychodystrophy of distal nail  NEURO: Normal strength and tone, mentation intact and speech normal  PSYCH: mentation appears normal, affect normal/bright         5/1/2025   Mini Cog   Clock Draw Score 2 Normal   3 Item Recall 3 objects recalled   Mini Cog Total Score 5              Signed Electronically by: Olimpia Lambert MD    "

## 2025-05-02 ENCOUNTER — ANCILLARY PROCEDURE (OUTPATIENT)
Dept: GENERAL RADIOLOGY | Facility: CLINIC | Age: 75
End: 2025-05-02
Attending: FAMILY MEDICINE
Payer: MEDICARE

## 2025-05-02 DIAGNOSIS — R05.1 ACUTE COUGH: ICD-10-CM

## 2025-05-02 PROCEDURE — 71046 X-RAY EXAM CHEST 2 VIEWS: CPT | Mod: GC | Performed by: RADIOLOGY

## 2025-05-09 ENCOUNTER — ANCILLARY PROCEDURE (OUTPATIENT)
Dept: MAMMOGRAPHY | Facility: CLINIC | Age: 75
End: 2025-05-09
Attending: FAMILY MEDICINE
Payer: COMMERCIAL

## 2025-05-09 DIAGNOSIS — Z12.31 ENCOUNTER FOR SCREENING MAMMOGRAM FOR MALIGNANT NEOPLASM OF BREAST: ICD-10-CM

## 2025-05-09 PROCEDURE — 77067 SCR MAMMO BI INCL CAD: CPT | Mod: GC | Performed by: STUDENT IN AN ORGANIZED HEALTH CARE EDUCATION/TRAINING PROGRAM

## 2025-05-09 PROCEDURE — 77063 BREAST TOMOSYNTHESIS BI: CPT | Mod: GC | Performed by: STUDENT IN AN ORGANIZED HEALTH CARE EDUCATION/TRAINING PROGRAM

## 2025-06-20 NOTE — PROGRESS NOTES
[facility] Dermatology Note  Encounter Date: Jun 25, 2025  Office Visit     Dermatology Problem List:  [place] [facility] [place]    # Hx melanoma, nasal bridge, s/p MMS (?2014) in [facility]  - MILKA signed, trying to get records  # AK  - s/p cryotherapy   # onychomycosis  - PAS positive for onychomycosis 10/25/2024  - previous tx: s/p PO terbinafine x3 months completed ~1/2025, ciclopirox 8% solution  # Rosacea  # Intradermal melanocytic nevus, L plantar foot  - s/p shave bx 10/25/2024  ____________________________________________    Assessment & Plan:    # LTM  - 2 brown macules, nasal bridge, outside of melanoma scar   - Photodocumentation taken on 06/25/2025  - still trying to get melanoma records    # AK, L lateral ankle   - Cryotherapy performed today. See procedure note below.     # onychomycosis  - s/p course of PO terbinafine x3 months completed ~1/2025 and ciclopirox 8% solution. Nail growing out well.   - PAS positive for onychomycosis 10/25/2024    # Blue nevus, left eyebrow  - stable    # Benign lesions - SKs, cherry angiomas, lentigenes.  - No treatment required    # Multiple benign nevi   - Monitor for ABCDEs of melanoma   - Continue sun protection - recommend SPF 30 or higher with frequent application   - Return sooner if noticing changing or symptomatic lesions     # Hx melanoma (unknown path), nasal bridge, s/p MMS 2014. No evidence of clinical recurrence on exam today.  - Records prev requested from Dr. Kirstin Escobar ([facility]). Pt will also try to obtain records.   - continue photoprotection (such as SPF30+ sunscreen and proper use, UPF clothing, sun avoidance, tanning bed avoidance)  - monitor for ABCDE of melanoma; advised to bring any lesions of concern (new or changing over time) to medical attention        Procedures Performed:   Cryotherapy procedure note: After verbal consent and discussion of risks and benefits including but no limited to dyspigmentation/scar, blister, and pain, 1 AK  was(were) treated with 1-2mm freeze border for 2 cycles with liquid nitrogen. Post cryotherapy instructions were provided.     Follow-up: 12 month(s) in-person, or earlier for new or changing lesions    Staff and Scribe:     Scribe Disclosure:   I, Amelia Estrada, am serving as a scribe; to document services personally performed by Beronica Mendez MD -based on data collection and the provider's statements to me.     Provider Disclosure:   The documentation recorded by the scribe accurately reflects the services I personally performed and the decisions made by me.    Beronica Mendez MD    Department of Dermatology  [facility]: Phone: [phone], Fax: [phone]  6/25/2025          ____________________________________________    CC: Skin Check (Here today for a skin check. No concerns. )    HPI:  Ms. Capri Simpson is a(n) 75 year old female who presents today as a return patient for above.     No specific skin concerns today.   Thinks her nails are growing out well, just taking a long time to grow out.   Questions about if normal nail polish makes onychodystrophy worse.   ROS negative for fever, chills, night sweats, or unintentional weight loss.    Patient is otherwise feeling well, without additional skin concerns.    Labs Reviewed:  Derm path 10/25/2024  Final Diagnosis   A. Left plantar foot:  - Intradermal melanocytic nevus - (see description)     B. Right 2nd toe nail:  - Onychomycosis - (see description)     Physical exam:  Vitals: LMP  (LMP Unknown)   GEN: This is a well developed, well-nourished female in no acute distress, in a pleasant mood.    SKIN: Total skin excluding the undergarment areas was performed. The exam included the head/face, neck, both arms, chest, back, abdomen, both legs, digits and/or nails.   - 2 even brown macules, nasal bridge, outside of melanoma scar   - There is an  erythematous macule with overyling adherent scale on the L lateral ankle.  - Multiple regular brown pigmented macules and papules are identified on the trunk and extremities.   - Scattered brown macules on sun exposed areas.  - There are dome shaped bright red papules on the trunk and extremities.   - Waxy stuck on papules and plaques on trunk and extremities.   - toenails with distal yellowish white discoloration, nml prox nail plate  - No other lesions of concern on areas examined.   LYMPH: No cervical, supraclavicular, axillary, or inguinal lymphadenopathy.    Medications:  Current Outpatient Medications   Medication Sig Dispense Refill    azelaic acid (FINACIA) 15 % external gel Apply topically 2 times daily 50 g 2    calcium carbonate (OS-JONNY) 1500 (600 Ca) MG tablet Take 600 mg by mouth daily.      vitamin D3 (CHOLECALCIFEROL) 50 mcg (2000 units) tablet Take 1 tablet by mouth daily.      ciclopirox (PENLAC) 8 % external solution Apply to adjacent skin and affected nails daily.  Remove with alcohol every 7 days, then repeat. (Patient not taking: Reported on 6/25/2025) 6.6 mL 11    pimecrolimus (ELIDEL) 1 % external cream  (Patient not taking: Reported on 6/25/2025)       No current facility-administered medications for this visit.      Past Medical History:   Patient Active Problem List   Diagnosis    History of melanoma    Age-related osteoporosis without current pathological fracture     Past Medical History:   Diagnosis Date    Melanoma of skin (H)     On tip of nose, removed by dermatologist. Dr Escobar    Osteoporosis     2019 and 2023        CC Olimpia Lambert MD  [address] [place] [address] on close of this encounter.

## 2025-06-25 ENCOUNTER — OFFICE VISIT (OUTPATIENT)
Dept: DERMATOLOGY | Facility: CLINIC | Age: 75
End: 2025-06-25
Attending: DERMATOLOGY
Payer: MEDICARE

## 2025-06-25 DIAGNOSIS — D22.9 MULTIPLE BENIGN NEVI: ICD-10-CM

## 2025-06-25 DIAGNOSIS — L57.0 ACTINIC KERATOSES: ICD-10-CM

## 2025-06-25 DIAGNOSIS — L82.1 SEBORRHEIC KERATOSES: ICD-10-CM

## 2025-06-25 DIAGNOSIS — Z12.83 SKIN CANCER SCREENING: ICD-10-CM

## 2025-06-25 DIAGNOSIS — D18.01 CHERRY ANGIOMA: ICD-10-CM

## 2025-06-25 DIAGNOSIS — L81.4 SOLAR LENTIGO: ICD-10-CM

## 2025-06-25 DIAGNOSIS — Z85.820 HISTORY OF MELANOMA: Primary | ICD-10-CM

## 2025-06-25 ASSESSMENT — PAIN SCALES - GENERAL: PAINLEVEL_OUTOF10: NO PAIN (0)

## 2025-06-25 NOTE — PATIENT INSTRUCTIONS
Checking for Skin Cancer  You can help find cancer early by checking your skin each month. There are 3 main kinds of skin cancer: melanoma, basal cell carcinoma, and squamous cell carcinoma. Doing monthly skin checks is the best way to find new marks, sores, or skin changes. Follow these instructions for checking your skin.   The ABCDEs of checking moles for melanoma   Check your moles or growths for signs of melanoma using ABCDE:   Asymmetry: The sides of the mole or growth don t match.  Border: The edges are ragged, notched, or blurred.  Color: The color within the mole or growth varies. It could be black, brown, tan, white, or shades of red, gray, or blue.  Diameter: The mole or growth is larger than   inch or 6 mm (size of a pencil eraser).  Evolving: The size, shape, texture, or color of the mole or growth is changing.     ABCDE's of moles on light skin.        ABCDE's of moles on dark skin may be harder to identify.     Checking for other types of skin cancer  Basal cell carcinoma or squamous cell carcinoma cause symptoms like:     A spot or mole that looks different from all other marks on your skin  Changes in how an area feels, such as itching, tenderness, or pain  Changes in the skin's surface, such as oozing, bleeding, or scaliness  A sore that doesn't heal  New swelling, redness, or spread of color beyond the border of a mole    Who s at risk?  Anyone of any skin color can get skin cancer. But you're at greater risk if you have:   Fair skin that freckles easily and burns instead of tanning  Light-colored or red hair  Light-colored eyes  Many moles or abnormal moles on your skin  A long history of unprotected exposure to sunlight or tanning beds  A history of many blistering sunburns as a child or teen  A family history of skin cancer  Been exposed to radiation or chemicals  A weakened immune system  Been exposed to arsenic  If you've had skin cancer in the past, you're at high risk of having it again.    How to check your skin  Do your monthly skin checkups in front of a full-length mirror. Use a room with good lighting so it's easier to see. Use a hand mirror to look at hard-to-see places like your buttocks and back. You can also have a trusted friend or family member help you with these checks. Check every part of your body, including your:   Head (ears, face, neck, and scalp)  Torso (front, back, sides, and under breasts)  Arms (tops, undersides, and armpits)  Hands (palms, backs, and fingers, including under the nails)  Lower back, buttocks, and genitals  Legs (front, back, and sides)  Feet (tops, soles, toes, including under the nails, and between toes)  Watch for new spots on your skin or a spot that's changing in color, shape, size.   If you have a lot of moles, take digital photos of them each month. Make sure to take photos both up close and from a distance. These can help you see if any moles change over time.   Know your skin  Most skin changes aren't cancer. But if you see any changes in your skin, call your healthcare provider right away. Only they can tell you if a change is a problem. If you have skin cancer, seeing your provider can be the first step to getting the treatment that could save your life.   Jacques last reviewed this educational content on 10/1/2021    7103-6085 [employer]. All rights reserved. This information is not intended as a substitute for professional medical care. Always follow your healthcare professional's instructions.     Cryotherapy    What is it?  Use of a very cold liquid, such as liquid nitrogen, to freeze and destroy abnormal skin cells that need to be removed    What should I expect?  Tenderness and redness  A small blister that might grow and fill with dark purple blood. There may be crusting.  More than one treatment may be needed if the lesions do not go away.    How do I care for the treated area?  Gently wash the area with your hands when  bathing.  Use a thin layer of Vaseline to help with healing. You may use a Band-Aid.   The area should heal within 7-10 days and may leave behind a pink or lighter color.   Do not use an antibiotic or Neosporin ointment.   You may take acetaminophen (Tylenol) for pain.     Call your doctor if you have:  Severe pain  Signs of infection (warmth, redness, cloudy yellow drainage, and or a bad smell)  Questions or concerns    Who should I call with questions?      [facility]: [phone]      [facility]: [phone]      For urgent needs outside of business hours call the [facility] at [phone] and ask for the dermatology resident on call

## 2025-06-25 NOTE — LETTER
6/25/2025       RE: Capri Simpson  [address]  [place] [address]     Dear Colleague,    Thank you for referring your patient, Capri Simpson, to the [facility] [place] [facility]. Please see a copy of my visit note below.    [facility] Dermatology Note  Encounter Date: Jun 25, 2025  Office Visit     Dermatology Problem List:  Last [facility] [place] 06/25/2025    # Hx melanoma, nasal bridge, s/p MMS (?2014) in [facility]  - MILKA signed, trying to get records  # AK  - s/p cryotherapy   # onychomycosis  - PAS positive for onychomycosis 10/25/2024  - previous tx: s/p PO terbinafine x3 months completed ~1/2025, ciclopirox 8% solution  # Rosacea  # Intradermal melanocytic nevus, L plantar foot  - s/p shave bx 10/25/2024  ____________________________________________    Assessment & Plan:    # LTM  - 2 brown macules, nasal bridge, outside of melanoma scar   - Photodocumentation taken on 06/25/2025  - still trying to get melanoma records    # AK, L lateral ankle   - Cryotherapy performed today. See procedure note below.     # onychomycosis  - s/p course of PO terbinafine x3 months completed ~1/2025 and ciclopirox 8% solution. Nail growing out well.   - PAS positive for onychomycosis 10/25/2024    # Blue nevus, left eyebrow  - stable    # Benign lesions - SKs, cherry angiomas, lentigenes.  - No treatment required    # Multiple benign nevi   - Monitor for ABCDEs of melanoma   - Continue sun protection - recommend SPF 30 or higher with frequent application   - Return sooner if noticing changing or symptomatic lesions     # Hx melanoma (unknown path), nasal bridge, s/p MMS 2014. No evidence of clinical recurrence on exam today.  - Records prev requested from Dr. Kirstin Escobar ([facility]). Pt will also try to obtain records.   - continue photoprotection (such as SPF30+ sunscreen and proper use, UPF clothing, sun avoidance,  tanning bed avoidance)  - monitor for ABCDE of melanoma; advised to bring any lesions of concern (new or changing over time) to medical attention        Procedures Performed:   Cryotherapy procedure note: After verbal consent and discussion of risks and benefits including but no limited to dyspigmentation/scar, blister, and pain, 1 AK was(were) treated with 1-2mm freeze border for 2 cycles with liquid nitrogen. Post cryotherapy instructions were provided.     Follow-up: 12 month(s) in-person, or earlier for new or changing lesions    Staff and Scribe:     Scribe Disclosure:   I, Amelia Dorado, am serving as a scribe; to document services personally performed by Beronica Mendez MD -based on data collection and the provider's statements to me.     Provider Disclosure:   The documentation recorded by the scribe accurately reflects the services I personally performed and the decisions made by me.    Beronica Mendez MD    Department of Dermatology  [facility]: Phone: [phone], Fax: [phone]  6/25/2025          ____________________________________________    CC: Skin Check (Here today for a skin check. No concerns. )    HPI:  Ms. Capri Simpson is a(n) 75 year old female who presents today as a return patient for above.     No specific skin concerns today.   Thinks her nails are growing out well, just taking a long time to grow out.   Questions about if normal nail polish makes onychodystrophy worse.   ROS negative for fever, chills, night sweats, or unintentional weight loss.    Patient is otherwise feeling well, without additional skin concerns.    Labs Reviewed:  Derm path 10/25/2024  Final Diagnosis   A. Left plantar foot:  - Intradermal melanocytic nevus - (see description)     B. Right 2nd toe nail:  - Onychomycosis - (see description)     Physical exam:  Vitals: LMP  (LMP Unknown)   GEN: This is a well  developed, well-nourished female in no acute distress, in a pleasant mood.    SKIN: Total skin excluding the undergarment areas was performed. The exam included the head/face, neck, both arms, chest, back, abdomen, both legs, digits and/or nails.   - 2 even brown macules, nasal bridge, outside of melanoma scar   - There is an erythematous macule with overyling adherent scale on the L lateral ankle.  - Multiple regular brown pigmented macules and papules are identified on the trunk and extremities.   - Scattered brown macules on sun exposed areas.  - There are dome shaped bright red papules on the trunk and extremities.   - Waxy stuck on papules and plaques on trunk and extremities.   - toenails with distal yellowish white discoloration, nml prox nail plate  - No other lesions of concern on areas examined.   LYMPH: No cervical, supraclavicular, axillary, or inguinal lymphadenopathy.    Medications:  Current Outpatient Medications   Medication Sig Dispense Refill     azelaic acid (FINACIA) 15 % external gel Apply topically 2 times daily 50 g 2     calcium carbonate (OS-JONNY) 1500 (600 Ca) MG tablet Take 600 mg by mouth daily.       vitamin D3 (CHOLECALCIFEROL) 50 mcg (2000 units) tablet Take 1 tablet by mouth daily.       ciclopirox (PENLAC) 8 % external solution Apply to adjacent skin and affected nails daily.  Remove with alcohol every 7 days, then repeat. (Patient not taking: Reported on 6/25/2025) 6.6 mL 11     pimecrolimus (ELIDEL) 1 % external cream  (Patient not taking: Reported on 6/25/2025)       No current facility-administered medications for this visit.      Past Medical History:   Patient Active Problem List   Diagnosis     History of melanoma     Age-related osteoporosis without current pathological fracture     Past Medical History:   Diagnosis Date     Melanoma of skin (H)     On tip of nose, removed by dermatologist. Dr Escobar     Osteoporosis     2019 and 2023        CC Olimpia Lambert MD  [address] [place] [address]  [address] [facility] [place] [address] on close of this encounter.    Again, thank you for allowing me to participate in the care of your patient.      Sincerely,    Beronica Mendez MD

## 2025-06-25 NOTE — NURSING NOTE
Dermatology Rooming Note    Capri Simpson's goals for this visit include:   Chief Complaint   Patient presents with    Skin Check     Here today for a skin check. No concerns.      Pippa Martinez RN